# Patient Record
Sex: FEMALE | Race: OTHER | NOT HISPANIC OR LATINO | Employment: UNEMPLOYED | URBAN - METROPOLITAN AREA
[De-identification: names, ages, dates, MRNs, and addresses within clinical notes are randomized per-mention and may not be internally consistent; named-entity substitution may affect disease eponyms.]

---

## 2018-01-30 ENCOUNTER — OFFICE VISIT (OUTPATIENT)
Dept: MEDICAL GROUP | Facility: MEDICAL CENTER | Age: 33
End: 2018-01-30
Payer: COMMERCIAL

## 2018-01-30 VITALS
HEART RATE: 83 BPM | SYSTOLIC BLOOD PRESSURE: 118 MMHG | WEIGHT: 249 LBS | TEMPERATURE: 98.7 F | OXYGEN SATURATION: 97 % | DIASTOLIC BLOOD PRESSURE: 74 MMHG | BODY MASS INDEX: 39.08 KG/M2 | HEIGHT: 67 IN | RESPIRATION RATE: 15 BRPM

## 2018-01-30 DIAGNOSIS — M54.17 LUMBOSACRAL RADICULITIS: ICD-10-CM

## 2018-01-30 DIAGNOSIS — Z72.0 NICOTINE VAPOR PRODUCT USER: ICD-10-CM

## 2018-01-30 DIAGNOSIS — E66.9 OBESITY (BMI 30-39.9): ICD-10-CM

## 2018-01-30 DIAGNOSIS — N89.8 VAGINAL ITCHING: ICD-10-CM

## 2018-01-30 DIAGNOSIS — F31.81 BIPOLAR 2 DISORDER, MAJOR DEPRESSIVE EPISODE (HCC): ICD-10-CM

## 2018-01-30 DIAGNOSIS — Z12.4 PAP SMEAR FOR CERVICAL CANCER SCREENING: ICD-10-CM

## 2018-01-30 PROBLEM — F17.200 NICOTINE DEPENDENCE: Status: ACTIVE | Noted: 2018-01-30

## 2018-01-30 PROCEDURE — 99204 OFFICE O/P NEW MOD 45 MIN: CPT | Performed by: NURSE PRACTITIONER

## 2018-01-30 RX ORDER — CELECOXIB 200 MG/1
200 CAPSULE ORAL 2 TIMES DAILY
Qty: 60 CAP | Refills: 6 | Status: SHIPPED | OUTPATIENT
Start: 2018-01-30 | End: 2018-01-31 | Stop reason: SDUPTHER

## 2018-01-30 RX ORDER — LAMOTRIGINE 200 MG/1
200 TABLET ORAL DAILY
Qty: 90 TAB | Refills: 3 | Status: SHIPPED | OUTPATIENT
Start: 2018-01-30 | End: 2019-03-12 | Stop reason: SDUPTHER

## 2018-01-30 RX ORDER — CELECOXIB 200 MG/1
200 CAPSULE ORAL 2 TIMES DAILY
Qty: 60 CAP | Refills: 6 | COMMUNITY
Start: 2018-01-30 | End: 2018-01-30 | Stop reason: SDUPTHER

## 2018-01-30 RX ORDER — LAMOTRIGINE 200 MG/1
200 TABLET ORAL DAILY
COMMUNITY
Start: 2018-01-30 | End: 2018-01-30 | Stop reason: SDUPTHER

## 2018-01-30 RX ORDER — FLUOXETINE HYDROCHLORIDE 20 MG/1
20 CAPSULE ORAL DAILY
Qty: 90 CAP | Refills: 3 | Status: SHIPPED | OUTPATIENT
Start: 2018-01-30 | End: 2018-10-10 | Stop reason: SDUPTHER

## 2018-01-30 RX ORDER — FLUOXETINE HYDROCHLORIDE 20 MG/1
20 CAPSULE ORAL DAILY
COMMUNITY
End: 2018-01-30 | Stop reason: SDUPTHER

## 2018-01-30 RX ORDER — FLUCONAZOLE 150 MG/1
150 TABLET ORAL DAILY
Qty: 1 TAB | Refills: 1 | Status: SHIPPED | OUTPATIENT
Start: 2018-01-30 | End: 2019-03-12

## 2018-01-30 ASSESSMENT — PATIENT HEALTH QUESTIONNAIRE - PHQ9
5. POOR APPETITE OR OVEREATING: 1 - SEVERAL DAYS
CLINICAL INTERPRETATION OF PHQ2 SCORE: 2
SUM OF ALL RESPONSES TO PHQ QUESTIONS 1-9: 12

## 2018-01-30 NOTE — LETTER
[facility]  JENNY Walton.P.R.N.  [address] [facility] [address]  [facility] [address]  Fax: [phone]   Authorization for Release/Disclosure of   Protected Health Information   Name: SHO CRAWLEY : 1985 SSN: [id number]   Address: [address] [place] [address] Phone:    [phone] (home)    I authorize the entity listed below to release/disclose the PHI below to:   [facility]/Katie Hopper A.P.R.N. and JENNY Walton.P.RGUANACO.   Provider or Entity Name:  Dr. Marleny Bran   Address   City, [facility] Phone:      Fax:     Reason for request: continuity of care   Information to be released:    [  ] LAST COLONOSCOPY,  including any PATH REPORT and follow-up  [  ] LAST FIT/COLOGUARD RESULT [  ] LAST DEXA  [  ] LAST MAMMOGRAM  [  ] LAST PAP  [  ] LAST LABS [  ] RETINA EXAM REPORT  [  ] IMMUNIZATION RECORDS  [  ] Release all info      [  ] Check here and initial the line next to each item to release ALL health information INCLUDING  _____ Care and treatment for drug and / or alcohol abuse  _____ HIV testing, infection status, or AIDS  _____ Genetic Testing    DATES OF SERVICE OR TIME PERIOD TO BE DISCLOSED: _____________  I understand and acknowledge that:  * This Authorization may be revoked at any time by you in writing, except if your health information has already been used or disclosed.  * Your health information that will be used or disclosed as a result of you signing this authorization could be re-disclosed by the recipient. If this occurs, your re-disclosed health information may no longer be protected by State or Federal laws.  * You may refuse to sign this Authorization. Your refusal will not affect your ability to obtain treatment.  * This Authorization becomes effective upon signing and will  on (date) __________.      If no date is indicated, this Authorization will  one (1) year from the signature date.    Name: Sho  Jay    Signature:   Date:     1/30/2018       PLEASE FAX REQUESTED RECORDS BACK TO: [phone]

## 2018-01-31 DIAGNOSIS — M54.17 LUMBOSACRAL RADICULITIS: ICD-10-CM

## 2018-01-31 RX ORDER — CELECOXIB 200 MG/1
200 CAPSULE ORAL 2 TIMES DAILY
Qty: 60 CAP | Refills: 6 | Status: SHIPPED | OUTPATIENT
Start: 2018-01-31 | End: 2019-02-05

## 2018-01-31 NOTE — PATIENT INSTRUCTIONS
Fluconazole tablets  What is this medicine?  FLUCONAZOLE (floo ESTEPHANIE na zole) is an antifungal medicine. It is used to treat certain kinds of fungal or yeast infections.  This medicine may be used for other purposes; ask your health care provider or pharmacist if you have questions.  COMMON BRAND NAME(S): Diflucan  What should I tell my health care provider before I take this medicine?  They need to know if you have any of these conditions:  -electrolyte abnormalities  -history of irregular heart beat  -kidney disease  -an unusual or allergic reaction to fluconazole, other azole antifungals, medicines, foods, dyes, or preservatives  -pregnant or trying to get pregnant  -breast-feeding  How should I use this medicine?  Take this medicine by mouth. Follow the directions on the prescription label. Do not take your medicine more often than directed.  Talk to your pediatrician regarding the use of this medicine in children. Special care may be needed. This medicine has been used in children as young as 6 months of age.  Overdosage: If you think you have taken too much of this medicine contact a poison control center or emergency room at once.  NOTE: This medicine is only for you. Do not share this medicine with others.  What if I miss a dose?  If you miss a dose, take it as soon as you can. If it is almost time for your next dose, take only that dose. Do not take double or extra doses.  What may interact with this medicine?  Do not take this medicine with any of the following medications:  -cisapride  -pimozide  -red yeast rice  This medicine may also interact with the following medications:  -birth control pills  -cyclosporine  -diuretics like hydrochlorothiazide  -medicines for diabetes that are taken by mouth  -medicines for high cholesterol like atorvastatin, lovastatin or simvastatin  -phenytoin  -ramelteon  -rifabutin  -rifampin  -some medicines for anxiety or  sleep  -tacrolimus  -terfenadine  -theophylline  -tofacitinib  -warfarin  This list may not describe all possible interactions. Give your health care provider a list of all the medicines, herbs, non-prescription drugs, or dietary supplements you use. Also tell them if you smoke, drink alcohol, or use illegal drugs. Some items may interact with your medicine.  What should I watch for while using this medicine?  Visit your doctor or health care professional for regular checkups. If you are taking this medicine for a long time you may need blood work. Tell your doctor if your symptoms do not improve. Some fungal infections need many weeks or months of treatment to cure.  Alcohol can increase possible damage to your liver. Avoid alcoholic drinks.  If you have a vaginal infection, do not have sex until you have finished your treatment. You can wear a sanitary napkin. Do not use tampons. Wear freshly washed cotton, not synthetic, panties.  What side effects may I notice from receiving this medicine?  Side effects that you should report to your doctor or health care professional as soon as possible:  -allergic reactions like skin rash or itching, hives, swelling of the lips, mouth, tongue, or throat  -dark urine  -feeling dizzy or faint  -irregular heartbeat or chest pain  -redness, blistering, peeling or loosening of the skin, including inside the mouth  -trouble breathing  -unusual bruising or bleeding  -vomiting  -yellowing of the eyes or skin  Side effects that usually do not require medical attention (report to your doctor or health care professional if they continue or are bothersome):  -changes in how food tastes  -diarrhea  -headache  -stomach upset or nausea  This list may not describe all possible side effects. Call your doctor for medical advice about side effects. You may report side effects to FDA at 1-727-FDA-4992.  Where should I keep my medicine?  Keep out of the reach of children.  Store at room  temperature below 30 degrees C (86 degrees F). Throw away any medicine after the expiration date.  NOTE: This sheet is a summary. It may not cover all possible information. If you have questions about this medicine, talk to your doctor, pharmacist, or health care provider.  © 2014, Elsevier/Gold Standard. (9/17/2013 3:15:11 PM)  Varenicline oral tablets  What is this medicine?  VARENICLINE (stephanie EN i kleen) is used to help people quit smoking. It can reduce the symptoms caused by stopping smoking. It is used with a patient support program recommended by your physician.  This medicine may be used for other purposes; ask your health care provider or pharmacist if you have questions.  COMMON BRAND NAME(S): Chantix  What should I tell my health care provider before I take this medicine?  They need to know if you have any of these conditions:  -bipolar disorder, depression, schizophrenia or other mental illness  -heart disease  -kidney disease  -peripheral vascular disease  -stroke  -suicidal thoughts, plans, or attempt; a previous suicide attempt by you or a family member  -an unusual or allergic reaction to varenicline, other medicines, foods, dyes, or preservatives  -pregnant or trying to get pregnant  -breast-feeding  How should I use this medicine?  You should set a date to stop smoking and tell your doctor. Start this medicine one week before the quit date. You can also start taking this medicine before you choose a quit date, and then pick a quit date that is between 8 and 35 days of treatment with this medicine. Stick to your plan; ask about support groups or other ways to help you remain a 'quitter'.  Take this medicine by mouth after eating. Take with a full glass of water. Follow the directions on the prescription label. Take your doses at regular intervals. Do not take your medicine more often than directed.  A special MedGuide will be given to you by the pharmacist with each prescription and refill. Be sure  to read this information carefully each time.  Talk to your pediatrician regarding the use of this medicine in children. This medicine is not approved for use in children.  Overdosage: If you think you have taken too much of this medicine contact a poison control center or emergency room at once.  NOTE: This medicine is only for you. Do not share this medicine with others.  What if I miss a dose?  If you miss a dose, take it as soon as you can. If it is almost time for your next dose, take only that dose. Do not take double or extra doses.  What may interact with this medicine?  -insulin  -other stop smoking aids  -theophylline  -warfarin  This list may not describe all possible interactions. Give your health care provider a list of all the medicines, herbs, non-prescription drugs, or dietary supplements you use. Also tell them if you smoke, drink alcohol, or use illegal drugs. Some items may interact with your medicine.  What should I watch for while using this medicine?  Visit your doctor or health care professional for regular check ups. Ask for ongoing advice and encouragement from your doctor or healthcare professional, friends, and family to help you quit. If you smoke while on this medication, quit again  Your mouth may get dry. Chewing sugarless gum or sucking hard candy, and drinking plenty of water may help. Contact your doctor if the problem does not go away or is severe.  You may get drowsy or dizzy. Do not drive, use machinery, or do anything that needs mental alertness until you know how this medicine affects you. Do not stand or sit up quickly, especially if you are an older patient. This reduces the risk of dizzy or fainting spells.  The use of this medicine may increase the chance of suicidal thoughts or actions. Pay special attention to how you are responding while on this medicine. Any worsening of mood, or thoughts of suicide or dying should be reported to your health care professional right  away.  What side effects may I notice from receiving this medicine?  Side effects that you should report to your doctor or health care professional as soon as possible:  -allergic reactions like skin rash, itching or hives, swelling of the face, lips, tongue, or throat  -breathing problems  -changes in vision  -chest pain or chest tightness  -confusion, trouble speaking or understanding  -fast, irregular heartbeat  -feeling faint or lightheaded, falls  -fever  -pain in legs when walking  -problems with balance, talking, walking  -ringing in ears  -sudden numbness or weakness of the face, arm or leg  -suicidal thoughts or other mood changes  -trouble passing urine or change in the amount of urine  -unusual bleeding or bruising  -unusually weak or tired  Side effects that usually do not require medical attention (report to your doctor or health care professional if they continue or are bothersome):  -constipation  -headache  -nausea, vomiting  -strange dreams  -stomach gas  -trouble sleeping  This list may not describe all possible side effects. Call your doctor for medical advice about side effects. You may report side effects to FDA at 4-334-FDA-4784.  Where should I keep my medicine?  Keep out of the reach of children.  Store at room temperature between 15 and 30 degrees C (59 and 86 degrees F). Throw away any unused medicine after the expiration date.  NOTE: This sheet is a summary. It may not cover all possible information. If you have questions about this medicine, talk to your doctor, pharmacist, or health care provider.  © 2014, Elsevier/Gold Standard. (7/25/2011 3:12:38 PM)

## 2018-01-31 NOTE — PROGRESS NOTES
CC: vaginal itching      Jg Thompson is a 32 y.o. female here to establish care and to discuss the evaluation and management of:    1. Vaginal itching  Patient states she has had vaginal itching for several weeks now. States that she has noticed some thick white mucousy discharge. States that she does have a IUD so her periods are not regular. States she has tried nothing to help her symptoms. Denies any fevers, chills, nausea or vomiting.    2. Bipolar 2 disorder, major depressive episode (CMS-[facility])  Patient states that she has bipolar 2 and has been treated with Prozac for the last 5 years and lamotrigine for the last year and a half almost 2 years. Patient states she tolerated these medications well without any side effects. Denies any suicidal ideations. States she is in the process of finding a new psychiatrist as her insurance change and she can no longer go to her previous one.    3. Lumbosacral radiculitis  Patient states that she was told she has this from her podiatry doctor who just recently today prescribed her a steroid taper pack to see if this can help with her symptoms. Patient states that she recently was told that she had had plantar fasciitis however symptoms were not improving with orthotics and anti-inflammatories. Patient states she currently takes Celebrex for her foot pain. States that she was  for a long time and this has caused her a great deal of pain in her feet.    4. Nicotine vapor product user  Patient states that she used to be a tobacco cigarette smoker however had changed over to the being and then was on Chantix for a few months and was able to quit bathing and cigarette smoking. States however had stopped using Chantix a few months ago and he started bleeding again. Requesting a refill on Chantix.        ROS:  Denies any Headache, Blurred Vision, Confusion Chest pain,  Shortness of breath,  Abdominal pain, Changes of bowel or bladder, Lower ext edema, Fevers, Nights  "sweats, Weight Changes, Focal weakness or numbness.  All other systems are negative.+ Bilateral foot pain, vaginal itch      Current Outpatient Prescriptions:   •  fluoxetine (PROZAC) 20 MG Cap, Take 1 Cap by mouth every day., Disp: 90 Cap, Rfl: 3  •  varenicline (CHANTIX STARTING MONTH JOSE JUAN) 0.5 MG X 11 & 1 MG X 42 tablet, On day 1-3 take 0.5mg daily, then on day 4-7 take 0.5mg twice daily, then take 1mg twice daily for 11 weeks., Disp: 56 Tab, Rfl: 3  •  fluconazole (DIFLUCAN) 150 MG tablet, Take 1 Tab by mouth every day., Disp: 1 Tab, Rfl: 1  •  celecoxib (CELEBREX) 200 MG Cap, Take 1 Cap by mouth 2 times a day., Disp: 60 Cap, Rfl: 6  •  lamotrigine (LAMICTAL) 200 MG tablet, Take 1 Tab by mouth every day., Disp: 90 Tab, Rfl: 3    No Known Allergies    Past Medical History:   Diagnosis Date   • Anxiety    • Depression    • Herpes     2006   • Lumbosacral radiculitis 1/30/2018   • Substance abuse      Past Surgical History:   Procedure Laterality Date   • TONSILLECTOMY       Family History   Problem Relation Age of Onset   • Psychiatry Mother    • Thyroid Mother    • Hypertension Mother    • Diabetes Mother    • Hypertension Maternal Grandfather      Social History     Social History   • Marital status: Single     Spouse name: N/A   • Number of children: N/A   • Years of education: N/A     Occupational History   • Not on file.     Social History Main Topics   • Smoking status: Former Smoker     Types: Cigarettes   • Smokeless tobacco: Never Used   • Alcohol use No   • Drug use: No      Comment: lasted used 2 yrs ago, IV user. every day.    • Sexual activity: Yes     Partners: Male     Other Topics Concern   • Not on file     Social History Narrative   • No narrative on file       Objective:     Vitals: /74   Pulse 83   Temp 37.1 °C (98.7 °F)   Resp 15   Ht 1.702 m (5' 7\")   Wt 112.9 kg (249 lb)   LMP 04/09/2011   SpO2 97%   BMI 39.00 kg/m²      General: Alert, pleasant, NAD  HEENT:  Normocephalic.  " Neck supple.  No thyromegaly or masses palpated. No cervical or supraclavicular lymphadenopathy.  Heart:  Regular rate and rhythm.  S1 and S2 normal.  No murmurs appreciated.  Respiratory:  Normal respiratory effort.  Clear to auscultation bilaterally.    Skin:  Warm, dry, no rashes  GYN: +report of vaginal itching and mucous discharge  Musculoskeletal:  Gait is normal.  Moves all extremities well.+pain in bilateral feet, no erythema or crepitus noted.  Extremities:   No leg edema.  Neurological: No tremors, sensation grossly intact  Psych:  Affect/mood is normal, judgement is good, memory is intact, grooming is appropriate.      Assessment and Plan.   32 y.o. female to establish and discuss the following      1. Vaginal itching  Patient declined doing vaginal pathogens test. Discussed perineal care, avoid douching, change underwear daily, wear cotton underwear, shower after working out, try to avoid wearing spandex/stretchy pants for extended periods of time.   - fluconazole (DIFLUCAN) 150 MG tablet; Take 1 Tab by mouth every day.  Dispense: 1 Tab; Refill: 1    2. Bipolar 2 disorder, major depressive episode (CMS-HCC)  Chronic. Stable, no suicidal ideations or fluctuant mood swings. Continue taking Prozac and lamotrigine daily. Avoid excessive alcohol intake, try to include exercise every day, and eat a heart healthy diet. Patient is to find out if she needs a referral for a new psychiatrist with her new insurance. If she does she will contact our office and I will place one.  - Patient has been identified as being depressed and appropriate orders and counseling have been given  - fluoxetine (PROZAC) 20 MG Cap; Take 1 Cap by mouth every day.  Dispense: 90 Cap; Refill: 3  - lamotrigine (LAMICTAL) 200 MG tablet; Take 1 Tab by mouth every day.  Dispense: 90 Tab; Refill: 3    3. Lumbosacral radiculitis  Chronic. Followed by her podiatrist.  - celecoxib (CELEBREX) 200 MG Cap; Take 1 Cap by mouth 2 times a day.   Dispense: 60 Cap; Refill: 6    4. Nicotine vapor product user  Chronic. Currently active with the vaping 3mg of nicotine. Will start Chantix again. Discussed with patient the importance of reducing tobacco consumption. Educated patient regarding the effects of tobacco use on cardiovascular system.  - varenicline (CHANTIX STARTING MONTH PAK) 0.5 MG X 11 & 1 MG X 42 tablet; On day 1-3 take 0.5mg daily, then on day 4-7 take 0.5mg twice daily, then take 1mg twice daily for 11 weeks.  Dispense: 56 Tab; Refill: 3    5. Obesity (BMI 30-39.9)  Chronic. Patient encouraged to reduce excess calorie consumption. Encouraged regular exercise. Discussed long term sequelae of obesity.   - Patient identified as having weight management issue.  Appropriate orders and counseling given.    6. Pap smear for cervical cancer screening    - REFERRAL TO GYNECOLOGY      Health Maintenance: Requesting records from previous provider as there are recent labs completed. Will review and order f/u as needed.     Return in about 6 months (around 7/30/2018).          Katie JOHN

## 2018-03-20 ENCOUNTER — HOSPITAL ENCOUNTER (OUTPATIENT)
Dept: LAB | Facility: MEDICAL CENTER | Age: 33
End: 2018-03-20
Attending: PHYSICIAN ASSISTANT
Payer: COMMERCIAL

## 2018-03-20 PROCEDURE — 87624 HPV HI-RISK TYP POOLED RSLT: CPT

## 2018-03-20 PROCEDURE — 88175 CYTOPATH C/V AUTO FLUID REDO: CPT

## 2018-03-21 LAB
CYTOLOGY REG CYTOL: NORMAL
HPV HR 12 DNA CVX QL NAA+PROBE: NEGATIVE
HPV16 DNA SPEC QL NAA+PROBE: NEGATIVE
HPV18 DNA SPEC QL NAA+PROBE: NEGATIVE
SPECIMEN SOURCE: NORMAL

## 2018-04-16 ENCOUNTER — OFFICE VISIT (OUTPATIENT)
Dept: URGENT CARE | Facility: PHYSICIAN GROUP | Age: 33
End: 2018-04-16
Payer: COMMERCIAL

## 2018-04-16 VITALS
SYSTOLIC BLOOD PRESSURE: 130 MMHG | WEIGHT: 250 LBS | HEIGHT: 67 IN | HEART RATE: 75 BPM | TEMPERATURE: 97.2 F | DIASTOLIC BLOOD PRESSURE: 68 MMHG | BODY MASS INDEX: 39.24 KG/M2 | OXYGEN SATURATION: 98 %

## 2018-04-16 DIAGNOSIS — S61.411A LACERATION OF RIGHT HAND WITHOUT FOREIGN BODY, INITIAL ENCOUNTER: ICD-10-CM

## 2018-04-16 PROCEDURE — 12001 RPR S/N/AX/GEN/TRNK 2.5CM/<: CPT | Performed by: PHYSICIAN ASSISTANT

## 2018-04-16 ASSESSMENT — PAIN SCALES - GENERAL: PAINLEVEL: 5=MODERATE PAIN

## 2018-04-17 ASSESSMENT — ENCOUNTER SYMPTOMS
CHILLS: 0
DIARRHEA: 0
NAUSEA: 0
SHORTNESS OF BREATH: 0
DIZZINESS: 0
MUSCULOSKELETAL NEGATIVE: 1
FEVER: 0
ABDOMINAL PAIN: 0
ROS SKIN COMMENTS: POSITIVE FOR LACERATION
VOMITING: 0

## 2018-04-17 NOTE — PROGRESS NOTES
"Subjective:      Jg Thompson is a 33 y.o. female who presents with Laceration (R hand while washing dishes)            Laceration    The incident occurred less than 1 hour ago. The laceration is located on the right hand. The laceration is 1 cm in size. The laceration mechanism was a broken glass. The pain is at a severity of 1/10. The pain is mild. The pain has been constant since onset. She reports no foreign bodies present. Her tetanus status is UTD.       Review of Systems   Constitutional: Negative for chills and fever.   HENT: Negative for congestion.    Respiratory: Negative for shortness of breath.    Cardiovascular: Negative for chest pain.   Gastrointestinal: Negative for abdominal pain, diarrhea, nausea and vomiting.   Genitourinary: Negative.    Musculoskeletal: Negative.    Skin:        Positive for laceration   Neurological: Negative for dizziness.          Objective:     /68   Pulse 75   Temp 36.2 °C (97.2 °F)   Ht 1.702 m (5' 7\")   Wt 113.4 kg (250 lb)   LMP 04/09/2011   SpO2 98%   BMI 39.16 kg/m²      Physical Exam   Constitutional: She is oriented to person, place, and time. She appears well-developed and well-nourished. No distress.   HENT:   Head: Normocephalic and atraumatic.   Eyes: Pupils are equal, round, and reactive to light.   Neck: Normal range of motion.   Cardiovascular: Normal rate.    Pulmonary/Chest: Effort normal.   Musculoskeletal: Normal range of motion.        Hands:  Linear laceration present in webspace between right thumb and 2nd digit, measuring approximately 1 cm. No active bleeding or foreign bodies noted.    Neurological: She is alert and oriented to person, place, and time.   Skin: Skin is warm and dry. She is not diaphoretic.   Psychiatric: She has a normal mood and affect. Her behavior is normal.   Nursing note and vitals reviewed.         PMH:  has a past medical history of Anxiety; Depression; Herpes; Lumbosacral radiculitis (1/30/2018); and " Substance abuse. She also has no past medical history of Addisons disease (CMS-HCC); Adrenal disorder (CMS-HCC); Allergy; Anemia; Blood transfusion; CHF (congestive heart failure) (CMS-HCC); Clotting disorder (CMS-HCC); Cushings syndrome (CMS-HCC); Diabetic neuropathy (CMS-HCC); EMPHYSEMA; GERD (gastroesophageal reflux disease); Goiter; Headache(784.0); Heart attack; HIV (human immunodeficiency virus infection); Hyperlipidemia; IBD (inflammatory bowel disease); Kidney disease; Meningitis; Migraine; OSTEOPOROSIS; Parathyroid disorder (CMS-HCC); Pituitary disease (CMS-HCC); Sickle cell disease (CMS-HCC); Thyroid disease; Tuberculosis; Ulcer (CMS-HCC); or Urinary tract infection, site not specified.  MEDS:   Current Outpatient Prescriptions:   •  fluoxetine (PROZAC) 20 MG Cap, Take 1 Cap by mouth every day., Disp: 90 Cap, Rfl: 3  •  lamotrigine (LAMICTAL) 200 MG tablet, Take 1 Tab by mouth every day., Disp: 90 Tab, Rfl: 3  •  celecoxib (CELEBREX) 200 MG Cap, Take 1 Cap by mouth 2 times a day., Disp: 60 Cap, Rfl: 6  •  varenicline (CHANTIX STARTING MONTH JOSE JUAN) 0.5 MG X 11 & 1 MG X 42 tablet, On day 1-3 take 0.5mg daily, then on day 4-7 take 0.5mg twice daily, then take 1mg twice daily for 11 weeks., Disp: 56 Tab, Rfl: 3  •  fluconazole (DIFLUCAN) 150 MG tablet, Take 1 Tab by mouth every day., Disp: 1 Tab, Rfl: 1  ALLERGIES:   Allergies   Allergen Reactions   • Penicillins      SURGHX:   Past Surgical History:   Procedure Laterality Date   • TONSILLECTOMY       SOCHX:  reports that she has quit smoking. Her smoking use included Cigarettes. She has never used smokeless tobacco. She reports that she does not drink alcohol or use drugs.  FH: family history includes Diabetes in her mother; Hypertension in her maternal grandfather and mother; Psychiatry in her mother; Thyroid in her mother.       Assessment/Plan:     1. Laceration of right hand without foreign body, initial encounter    Procedure: Laceration Repair  -Risks  including bleeding, nerve damage, infection, and poor cosmetic outcome discussed at length. Benefits and alternatives discussed.   -Sterile technique throughout  -Local anesthesia with 2% lidocaine without epi  -Closed with #3  4-0 Nylon interrupted sutures with good wound approximation  -Polysporin and dressing placed  -Patient tolerated well     Wound care discussed at length. RTC in 7-10 days for suture removal, or sooner if signs of infection develop.

## 2018-04-25 ENCOUNTER — OFFICE VISIT (OUTPATIENT)
Dept: URGENT CARE | Facility: PHYSICIAN GROUP | Age: 33
End: 2018-04-25
Payer: COMMERCIAL

## 2018-04-25 VITALS
DIASTOLIC BLOOD PRESSURE: 72 MMHG | WEIGHT: 250 LBS | RESPIRATION RATE: 18 BRPM | TEMPERATURE: 97.7 F | SYSTOLIC BLOOD PRESSURE: 120 MMHG | HEART RATE: 78 BPM | BODY MASS INDEX: 37.89 KG/M2 | HEIGHT: 68 IN | OXYGEN SATURATION: 98 %

## 2018-04-25 DIAGNOSIS — Z48.02 VISIT FOR SUTURE REMOVAL: ICD-10-CM

## 2018-04-25 PROCEDURE — 99212 OFFICE O/P EST SF 10 MIN: CPT | Performed by: NURSE PRACTITIONER

## 2018-04-25 ASSESSMENT — ENCOUNTER SYMPTOMS
CONSTITUTIONAL NEGATIVE: 1
RESPIRATORY NEGATIVE: 1
CARDIOVASCULAR NEGATIVE: 1
NEUROLOGICAL NEGATIVE: 1

## 2018-04-25 ASSESSMENT — PAIN SCALES - GENERAL: PAINLEVEL: 5=MODERATE PAIN

## 2018-04-26 NOTE — PROGRESS NOTES
Subjective:      Jg Thompson is a 33 y.o. female who presents with Hand Pain (previous stitches fell out; painful, swollen x3days)            HPI  Has stiches placed 4/16 to rt hand b/w 1st and 2nd digit.  Pt went out of town and lost to stiches--> fellout  Pt c/o pain and tenderness to touch  C/o Swelling and redness  No drainage  No fevers  No swimming just showers  10/10 when touches it   Worried about infection    PMH:  has a past medical history of Anxiety; Depression; Herpes; Lumbosacral radiculitis (1/30/2018); and Substance abuse. She also has no past medical history of Addisons disease (CMS-HCC); Adrenal disorder (CMS-HCC); Allergy; Anemia; Blood transfusion; CHF (congestive heart failure) (CMS-HCC); Clotting disorder (CMS-HCC); Cushings syndrome (CMS-HCC); Diabetic neuropathy (CMS-HCC); EMPHYSEMA; GERD (gastroesophageal reflux disease); Goiter; Headache(784.0); Heart attack; HIV (human immunodeficiency virus infection); Hyperlipidemia; IBD (inflammatory bowel disease); Kidney disease; Meningitis; Migraine; OSTEOPOROSIS; Parathyroid disorder (CMS-HCC); Pituitary disease (CMS-HCC); Sickle cell disease (CMS-HCC); Thyroid disease; Tuberculosis; Ulcer (CMS-HCC); or Urinary tract infection, site not specified.  MEDS:   Current Outpatient Prescriptions:   •  fluoxetine (PROZAC) 20 MG Cap, Take 1 Cap by mouth every day., Disp: 90 Cap, Rfl: 3  •  lamotrigine (LAMICTAL) 200 MG tablet, Take 1 Tab by mouth every day., Disp: 90 Tab, Rfl: 3  •  celecoxib (CELEBREX) 200 MG Cap, Take 1 Cap by mouth 2 times a day., Disp: 60 Cap, Rfl: 6  •  varenicline (CHANTIX STARTING MONTH PAK) 0.5 MG X 11 & 1 MG X 42 tablet, On day 1-3 take 0.5mg daily, then on day 4-7 take 0.5mg twice daily, then take 1mg twice daily for 11 weeks., Disp: 56 Tab, Rfl: 3  •  fluconazole (DIFLUCAN) 150 MG tablet, Take 1 Tab by mouth every day., Disp: 1 Tab, Rfl: 1  ALLERGIES:   Allergies   Allergen Reactions   • Penicillins      SURGHX:   Past  "Surgical History:   Procedure Laterality Date   • TONSILLECTOMY       SOCHX:  reports that she has quit smoking. Her smoking use included Cigarettes. She has never used smokeless tobacco. She reports that she drinks alcohol. She reports that she does not use drugs.  FH: family history includes Diabetes in her mother; Hypertension in her maternal grandfather and mother; Psychiatry in her mother; Thyroid in her mother.      Review of Systems   Constitutional: Negative.    Respiratory: Negative.    Cardiovascular: Negative.    Musculoskeletal: Positive for joint pain.   Skin:        Healed laceration   Neurological: Negative.           Objective:     /72   Pulse 78   Temp 36.5 °C (97.7 °F)   Resp 18   Ht 1.715 m (5' 7.5\")   Wt 113.4 kg (250 lb)   LMP 04/09/2011   SpO2 98%   BMI 38.58 kg/m²      Physical Exam   Constitutional: She is oriented to person, place, and time. She appears well-developed and well-nourished.   HENT:   Head: Normocephalic and atraumatic.   Eyes: Conjunctivae are normal.   Neck: Normal range of motion. Neck supple.   Cardiovascular: Normal rate, regular rhythm and normal heart sounds.    Pulmonary/Chest: Effort normal and breath sounds normal.   Musculoskeletal: Normal range of motion.   Neurological: She is alert and oriented to person, place, and time.   Skin: Skin is warm. Capillary refill takes less than 2 seconds.        1 stitch to rt hand b/w thumb and 2nd digit  No signs of infection  No erythema  No drainage  2+ radial pulse  No swelling to hands     Psychiatric: She has a normal mood and affect. Her behavior is normal. Judgment and thought content normal.               Assessment/Plan:     1. Visit for suture removal         1 suture removed for rt hand.  No signs of infection  Dicussed signs and symptoms of infection to monitor for  Apply polysporin and cover with bandage  No soaking until completely healed  f/u for any worsening symptoms      "

## 2018-07-30 ENCOUNTER — OFFICE VISIT (OUTPATIENT)
Dept: MEDICAL GROUP | Facility: MEDICAL CENTER | Age: 33
End: 2018-07-30
Payer: COMMERCIAL

## 2018-07-30 VITALS
HEIGHT: 68 IN | DIASTOLIC BLOOD PRESSURE: 62 MMHG | HEART RATE: 97 BPM | WEIGHT: 252 LBS | TEMPERATURE: 98.5 F | OXYGEN SATURATION: 95 % | BODY MASS INDEX: 38.19 KG/M2 | SYSTOLIC BLOOD PRESSURE: 108 MMHG

## 2018-07-30 DIAGNOSIS — Z13.220 SCREENING FOR HYPERLIPIDEMIA: ICD-10-CM

## 2018-07-30 DIAGNOSIS — F17.290 OTHER TOBACCO PRODUCT NICOTINE DEPENDENCE, UNCOMPLICATED: ICD-10-CM

## 2018-07-30 DIAGNOSIS — R53.83 OTHER FATIGUE: ICD-10-CM

## 2018-07-30 DIAGNOSIS — E66.9 OBESITY (BMI 35.0-39.9 WITHOUT COMORBIDITY): ICD-10-CM

## 2018-07-30 DIAGNOSIS — E66.9 OBESITY (BMI 30-39.9): ICD-10-CM

## 2018-07-30 DIAGNOSIS — F31.81 BIPOLAR 2 DISORDER, MAJOR DEPRESSIVE EPISODE (HCC): ICD-10-CM

## 2018-07-30 PROCEDURE — 99214 OFFICE O/P EST MOD 30 MIN: CPT | Performed by: INTERNAL MEDICINE

## 2018-07-30 RX ORDER — OMEPRAZOLE 20 MG/1
20 CAPSULE, DELAYED RELEASE ORAL DAILY
COMMUNITY
End: 2020-07-28

## 2018-07-31 NOTE — ASSESSMENT & PLAN NOTE
She is bipolar.  She is on Prozac 20 mg daily and she thinks she needs to increase the dose.  She also is on Lamictal.  She has had no trouble taking Prozac.

## 2018-07-31 NOTE — ASSESSMENT & PLAN NOTE
She is trying to get off of vaping.  She is using Chantix which seems to help quite a bit.  She has had no trouble starting this medication.  The prescription was written in January but she just recently started it.

## 2018-07-31 NOTE — PROGRESS NOTES
Subjective:     Chief Complaint   Patient presents with   • Follow-Up     discuss vesna Gregorio Jay is a 33 y.o. female here today for further evaluation of depression as well as evaluation of fatigue.  She is accompanied by her .    Screening for hyperlipidemia  She has not had a lipid panel checked in quite some time and would like to get that done.    Other fatigue  She feels more fatigued than usual.  She is under more stress than usual.  She wonders about anemia or thyroid problems.  Thyroid trouble does run in the family apparently.  She denies any significant temperature intolerance or recent change in bowel habits.    Obesity (BMI 35.0-39.9 without comorbidity) ([facility])  She remains quite overweight with BMI 38.64.    Nicotine dependence  She is trying to get off of vaping.  She is using Chantix which seems to help quite a bit.  She has had no trouble starting this medication.  The prescription was written in January but she just recently started it.    Bipolar 2 disorder, major depressive episode (CMS-HCC) ([facility])  She is bipolar.  She is on Prozac 20 mg daily and she thinks she needs to increase the dose.  She also is on Lamictal.  She has had no trouble taking Prozac.       Diagnoses of Other fatigue, Screening for hyperlipidemia, Obesity (BMI 30-39.9), Bipolar 2 disorder, major depressive episode (HCC), Other tobacco product nicotine dependence, uncomplicated, and Obesity (BMI 35.0-39.9 without comorbidity) were pertinent to this visit.    Allergies: Penicillins  Current medicines (including changes today)  Current Outpatient Prescriptions   Medication Sig Dispense Refill   • omeprazole (PRILOSEC) 20 MG delayed-release capsule Take 20 mg by mouth every day.     • fluoxetine (PROZAC) 20 MG Cap Take 1 Cap by mouth every day. 90 Cap 3   • varenicline (CHANTIX STARTING MONTH PAK) 0.5 MG X 11 & 1 MG X 42 tablet On day 1-3 take 0.5mg daily, then on day 4-7 take 0.5mg twice daily, then take 1mg twice  "daily for 11 weeks. 56 Tab 3   • fluconazole (DIFLUCAN) 150 MG tablet Take 1 Tab by mouth every day. 1 Tab 1   • lamotrigine (LAMICTAL) 200 MG tablet Take 1 Tab by mouth every day. 90 Tab 3   • celecoxib (CELEBREX) 200 MG Cap Take 1 Cap by mouth 2 times a day. 60 Cap 6     No current facility-administered medications for this visit.        She  has a past medical history of Anxiety; Depression; Herpes; Lumbosacral radiculitis (1/30/2018); Other fatigue (7/30/2018); and Substance abuse. She also has no past medical history of Addisons disease (HCC); Adrenal disorder (HCC); Allergy; Anemia; Blood transfusion; CHF (congestive heart failure) (HCC); Clotting disorder (HCC); Cushings syndrome (HCC); Diabetic neuropathy (HCC); EMPHYSEMA; GERD (gastroesophageal reflux disease); Goiter; Headache(784.0); Heart attack (HCC); HIV (human immunodeficiency virus infection); Hyperlipidemia; IBD (inflammatory bowel disease); Kidney disease; Meningitis; Migraine; OSTEOPOROSIS; Parathyroid disorder (HCC); Pituitary disease (HCC); Sickle cell disease (HCC); Thyroid disease; Tuberculosis; Ulcer; or Urinary tract infection, site not specified.    ROS    Patient denies significant change in strength, weight or appetite.  No significant lightheadedness or headaches.  No change in vision, hearing, or swallowing.  No new dyspnea, coughing, chest pain, or palpitations.  No indigestion, abdominal pain, or change in bowel habits.  No change in urinating.  No new ankle swelling.       Objective:     PE:  /62   Pulse 97   Temp 36.9 °C (98.5 °F)   Ht 1.72 m (5' 7.72\")   Wt 114.3 kg (252 lb)   SpO2 95%   BMI 38.64 kg/m²    Neck is supple without significant lymphadenopathy or masses.  Lungs are clear with normal breath sounds without wheezes or rales .  Cardiovascular: peripheral circulation is satisfactory, heart sounds are unchanged and unremarkable.  Abdomen is soft, without masses or tenderness, with normal bowel " sounds.  Extremities are without significant edema, cyanosis or deformity.      Assessment and Plan:   The following treatment plan was discussed  1. Other fatigue  CBC WITH DIFFERENTIAL    TSH    BASIC METABOLIC PANEL    We will check thyroid function as well as a blood count.   2. Screening for hyperlipidemia  LIPID PROFILE    We will get a screening lipid panel done.   3. Obesity (BMI 30-39.9)     4. Bipolar 2 disorder, major depressive episode (HCC)      Continue same dose Lamictal.  She will increase Prozac to 40 mg daily.  We reviewed potential side effects.  She is not interested in harming herself.   5. Other tobacco product nicotine dependence, uncomplicated      Continue Chantix and weaning off of nicotine.   6. Obesity (BMI 35.0-39.9 without comorbidity)  Patient identified as having weight management issue.  Appropriate orders and counseling given.    She was encouraged and weight loss program including appropriate diet and exercise.       Followup: Return in about 4 weeks (around 8/27/2018) for Short.

## 2018-07-31 NOTE — ASSESSMENT & PLAN NOTE
She feels more fatigued than usual.  She is under more stress than usual.  She wonders about anemia or thyroid problems.  Thyroid trouble does run in the family apparently.  She denies any significant temperature intolerance or recent change in bowel habits.

## 2018-07-31 NOTE — PROGRESS NOTES
Subjective:     Chief Complaint   Patient presents with   • Follow-Up     discuss vesna Gregorio Jay is a 33 y.o. female here today for consideration especially of increasing dose of Paxil.  She also feels fatigued and is overweight.    Screening for hyperlipidemia  She has not had a lipid panel checked in quite some time and would like to get that done.    Other fatigue  She feels more fatigued than usual.  She is under more stress than usual.  She wonders about anemia or thyroid problems.  Thyroid trouble does run in the family apparently.  She denies any significant temperature intolerance or recent change in bowel habits.    Obesity (BMI 35.0-39.9 without comorbidity) ([facility])  She remains quite overweight with BMI 38.64.    Nicotine dependence  She is trying to get off of vaping.  She is using Chantix which seems to help quite a bit.  She has had no trouble starting this medication.  The prescription was written in January but she just recently started it.    Bipolar 2 disorder, major depressive episode (CMS-HCC) ([facility])  She is bipolar.  She is on Prozac 20 mg daily and she thinks she needs to increase the dose.  She also is on Lamictal.  She has had no trouble taking Prozac.       Diagnoses of Other fatigue, Screening for hyperlipidemia, Obesity (BMI 30-39.9), Bipolar 2 disorder, major depressive episode (HCC), Other tobacco product nicotine dependence, uncomplicated, and Obesity (BMI 35.0-39.9 without comorbidity) were pertinent to this visit.    Allergies: Penicillins  Current medicines (including changes today)  Current Outpatient Prescriptions   Medication Sig Dispense Refill   • omeprazole (PRILOSEC) 20 MG delayed-release capsule Take 20 mg by mouth every day.     • fluoxetine (PROZAC) 20 MG Cap Take 1 Cap by mouth every day. 90 Cap 3   • varenicline (CHANTIX STARTING MONTH PAK) 0.5 MG X 11 & 1 MG X 42 tablet On day 1-3 take 0.5mg daily, then on day 4-7 take 0.5mg twice daily, then take 1mg twice daily  "for 11 weeks. 56 Tab 3   • fluconazole (DIFLUCAN) 150 MG tablet Take 1 Tab by mouth every day. 1 Tab 1   • lamotrigine (LAMICTAL) 200 MG tablet Take 1 Tab by mouth every day. 90 Tab 3   • celecoxib (CELEBREX) 200 MG Cap Take 1 Cap by mouth 2 times a day. 60 Cap 6     No current facility-administered medications for this visit.        She  has a past medical history of Anxiety; Depression; Herpes; Lumbosacral radiculitis (1/30/2018); Other fatigue (7/30/2018); and Substance abuse. She also has no past medical history of Addisons disease (HCC); Adrenal disorder (HCC); Allergy; Anemia; Blood transfusion; CHF (congestive heart failure) (HCC); Clotting disorder (HCC); Cushings syndrome (HCC); Diabetic neuropathy (HCC); EMPHYSEMA; GERD (gastroesophageal reflux disease); Goiter; Headache(784.0); Heart attack (HCC); HIV (human immunodeficiency virus infection); Hyperlipidemia; IBD (inflammatory bowel disease); Kidney disease; Meningitis; Migraine; OSTEOPOROSIS; Parathyroid disorder (HCC); Pituitary disease (HCC); Sickle cell disease (HCC); Thyroid disease; Tuberculosis; Ulcer; or Urinary tract infection, site not specified.    ROS    Patient denies significant change in strength, weight or appetite.  No significant lightheadedness or headaches.  No change in vision, hearing, or swallowing.  No new dyspnea, coughing, chest pain, or palpitations.  No indigestion, abdominal pain, or change in bowel habits.  No change in urinating.  No new ankle swelling.       Objective:     PE:  /62   Pulse 97   Temp 36.9 °C (98.5 °F)   Ht 1.72 m (5' 7.72\")   Wt 114.3 kg (252 lb)   SpO2 95%   BMI 38.64 kg/m²    Neck is supple without significant lymphadenopathy or masses.  Lungs are clear with normal breath sounds without wheezes or rales .  Cardiovascular: peripheral circulation is satisfactory, heart sounds are unchanged and unremarkable.  Abdomen is soft, without masses or tenderness, with normal bowel sounds.  Extremities are " without significant edema, cyanosis or deformity.      Assessment and Plan:   The following treatment plan was discussed  1. Other fatigue  CBC WITH DIFFERENTIAL    TSH    BASIC METABOLIC PANEL    We will check thyroid function as well as a blood count.   2. Screening for hyperlipidemia  LIPID PROFILE    We will get a screening lipid panel done.   3. Obesity (BMI 30-39.9)     4. Bipolar 2 disorder, major depressive episode (HCC)      Continue same dose Lamictal.  She will increase Paxil to 40 mg daily.  We reviewed potential side effects.   5. Other tobacco product nicotine dependence, uncomplicated      Continue Chantix and weaning off of nicotine.   6. Obesity (BMI 35.0-39.9 without comorbidity)      She was encouraged and weight loss program including appropriate diet and exercise.       Followup: Return in about 4 weeks (around 8/27/2018) for Short.

## 2018-09-04 ENCOUNTER — OFFICE VISIT (OUTPATIENT)
Dept: MEDICAL GROUP | Facility: MEDICAL CENTER | Age: 33
End: 2018-09-04
Payer: COMMERCIAL

## 2018-09-04 VITALS
WEIGHT: 244 LBS | RESPIRATION RATE: 15 BRPM | SYSTOLIC BLOOD PRESSURE: 118 MMHG | OXYGEN SATURATION: 96 % | DIASTOLIC BLOOD PRESSURE: 70 MMHG | BODY MASS INDEX: 38.3 KG/M2 | HEART RATE: 74 BPM | TEMPERATURE: 97.5 F | HEIGHT: 67 IN

## 2018-09-04 DIAGNOSIS — F31.81 BIPOLAR 2 DISORDER, MAJOR DEPRESSIVE EPISODE (HCC): ICD-10-CM

## 2018-09-04 DIAGNOSIS — R53.83 OTHER FATIGUE: ICD-10-CM

## 2018-09-04 DIAGNOSIS — M72.2 PLANTAR FASCIITIS: ICD-10-CM

## 2018-09-04 DIAGNOSIS — Z13.6 SCREENING FOR CARDIOVASCULAR CONDITION: ICD-10-CM

## 2018-09-04 PROBLEM — Z13.220 SCREENING FOR HYPERLIPIDEMIA: Status: RESOLVED | Noted: 2018-07-30 | Resolved: 2018-09-04

## 2018-09-04 PROCEDURE — 99213 OFFICE O/P EST LOW 20 MIN: CPT | Performed by: NURSE PRACTITIONER

## 2018-09-04 NOTE — PROGRESS NOTES
cc:  Follow up fatigue      Subjective:     HPI:     Jg Thompson is a 33 y.o. female here to discuss the evaluation and management of:    Fatigue  Bipolar 2 disorder  States she is feeling somewhat better. States she was feeling depressed and not wanting to get out of bed or do anything productive. Felt like she was overwhelming herself and taking on too much. She has realized she cannot do this as these actions contribute to her fatigue and depression. States has made some changes and has tried to get out of the house, be more active with kids, getting organized. Is not going to commit to a lot of obligations.  States she did not get her labs done from her last visit.     Foot pain  States they are feeling better.  She was told she has heel spurs and plantar fasciitis from one podiatrist.  Then was told at some point something was wrong with her back but she does not feel it is her back that is causing her feet pain. Does not have back pain.  Has been taking Aleve for pain-this is helping. Has to be careful since her stomach was getting irritated when she took too much. She is a  and she works 4 days per week.    ROS:  Denies any Headache, Blurred Vision, Confusion, Chest pain,  Shortness of breath,  Abdominal pain, Changes of bowel or bladder, Lower ext edema, Fevers, Nights sweats, Weight Changes, Focal weakness or numbness.  All other systems are negative.  Foot pain, fatigue        Current Outpatient Prescriptions:   •  omeprazole (PRILOSEC) 20 MG delayed-release capsule, Take 20 mg by mouth every day., Disp: , Rfl:   •  celecoxib (CELEBREX) 200 MG Cap, Take 1 Cap by mouth 2 times a day., Disp: 60 Cap, Rfl: 6  •  fluoxetine (PROZAC) 20 MG Cap, Take 1 Cap by mouth every day., Disp: 90 Cap, Rfl: 3  •  varenicline (CHANTIX STARTING MONTH PAK) 0.5 MG X 11 & 1 MG X 42 tablet, On day 1-3 take 0.5mg daily, then on day 4-7 take 0.5mg twice daily, then take 1mg twice daily for 11 weeks., Disp: 56 Tab, Rfl:  "3  •  fluconazole (DIFLUCAN) 150 MG tablet, Take 1 Tab by mouth every day., Disp: 1 Tab, Rfl: 1  •  lamotrigine (LAMICTAL) 200 MG tablet, Take 1 Tab by mouth every day., Disp: 90 Tab, Rfl: 3    Allergies   Allergen Reactions   • Penicillins        Past Medical History:   Diagnosis Date   • Anxiety    • Depression    • Herpes     2006   • Lumbosacral radiculitis 1/30/2018   • Other fatigue 7/30/2018   • Substance abuse      Past Surgical History:   Procedure Laterality Date   • TONSILLECTOMY       Family History   Problem Relation Age of Onset   • Psychiatry Mother    • Thyroid Mother    • Hypertension Mother    • Diabetes Mother    • Hypertension Maternal Grandfather      Social History     Social History   • Marital status: Single     Spouse name: N/A   • Number of children: N/A   • Years of education: N/A     Occupational History   • Not on file.     Social History Main Topics   • Smoking status: Former Smoker     Types: Cigarettes   • Smokeless tobacco: Never Used      Comment: vape   • Alcohol use Yes      Comment: occasional   • Drug use: No      Comment: lasted used 2 yrs ago, IV user. every day.    • Sexual activity: Yes     Partners: Male     Other Topics Concern   • Not on file     Social History Narrative   • No narrative on file       Objective:     Vitals: /70   Pulse 74   Temp 36.4 °C (97.5 °F)   Resp 15   Ht 1.702 m (5' 7\")   Wt 110.7 kg (244 lb)   SpO2 96%   BMI 38.22 kg/m²    General: Alert, pleasant, NAD  HEENT: Normocephalic.    Heart: Regular rate and rhythm.  S1 and S2 normal.  No murmurs appreciated.  Respiratory: Normal respiratory effort.  Clear to auscultation bilaterally.  Skin: Warm, dry, no rashes.  Musculoskeletal: Gait is normal.  Moves all extremities well.  Extremities: No leg edema.   Neurological: No tremors, sensation grossly intact, gait is normal,   Psych:  Affect/mood is normal, judgement is good, memory is intact, grooming is appropriate.    Assessment/Plan: "     Jg was seen today for fatigue.    Diagnoses and all orders for this visit:    Other fatigue  Improving.  Re-ordered labs  -     CBC WITH DIFFERENTIAL; Future  -     TSH WITH REFLEX TO FT4; Future  -     COMP METABOLIC PANEL; Future    Bipolar 2 disorder, major depressive episode (HCC)  Chronic.  Continue taking her Prozac.  Continue going to counseling, meetings. Avoid over committing her self.     Plantar fasciitis  Improving.  Patient would like to see a new podiatrist.  Encouraged patient to get a second opinion if she would like.  Handouts provided for patient in regards to stretching and managing plantar fasciitis.  Advised patient also wears compression stockings since she is on her feet all day especially stretching her calves as she states that her Achilles tendon is also tight.    Screening for cardiovascular condition  -     LIPID PROFILE; Future           Health care Maintenance:     Return in about 4 months (around 1/4/2019), or if symptoms worsen or fail to improve.          Katie JOHN

## 2018-09-24 ENCOUNTER — OFFICE VISIT (OUTPATIENT)
Dept: URGENT CARE | Facility: CLINIC | Age: 33
End: 2018-09-24
Payer: COMMERCIAL

## 2018-09-24 VITALS
OXYGEN SATURATION: 95 % | RESPIRATION RATE: 14 BRPM | TEMPERATURE: 97.6 F | HEART RATE: 66 BPM | BODY MASS INDEX: 35.58 KG/M2 | DIASTOLIC BLOOD PRESSURE: 78 MMHG | WEIGHT: 234.8 LBS | SYSTOLIC BLOOD PRESSURE: 122 MMHG | HEIGHT: 68 IN

## 2018-09-24 DIAGNOSIS — K52.9 GASTROENTERITIS, INFECTIOUS, PRESUMED: ICD-10-CM

## 2018-09-24 PROCEDURE — 99202 OFFICE O/P NEW SF 15 MIN: CPT | Performed by: EMERGENCY MEDICINE

## 2018-09-24 ASSESSMENT — ENCOUNTER SYMPTOMS
SENSORY CHANGE: 0
SORE THROAT: 0
FOCAL WEAKNESS: 0
DIARRHEA: 1
HEADACHES: 1
NAUSEA: 1
BLOOD IN STOOL: 0
SHORTNESS OF BREATH: 0
VOMITING: 1
CHILLS: 1
ABDOMINAL PAIN: 1

## 2018-09-24 NOTE — LETTER
September 24, 2018       Patient: Jg Thompson   YOB: 1985   Date of Visit: 9/24/2018         To Whom It May Concern:    It is my medical opinion that Jg Thompson should not attend work today or tomorrow; may return without restriction in 2 days.      Sincerely,          Nehemiah Hinson M.D.  Electronically Signed

## 2018-09-25 NOTE — PROGRESS NOTES
Subjective:      Jg Thompson is a 33 y.o. female who presents with GI Problem; Fever; and Other (vomitting, diarrhea, cold sweats x 3-4 days)            Gastroenteritis    This is a new problem. Episode onset: 3 days. The problem has been rapidly improving. The emesis has an appearance of stomach contents. Maximum temperature: subjective, resolved. Associated symptoms include abdominal pain, chills, diarrhea and headaches. Pertinent negatives include no chest pain or URI. Risk factors include ill contacts. She has tried increased fluids for the symptoms. The treatment provided moderate relief.   Patient notes daughter with recent diarrheal illness.  Notes symptoms much improved today; needs a note for return to work as .    Review of Systems   Constitutional: Positive for chills and malaise/fatigue.   HENT: Negative for sore throat.    Respiratory: Negative for shortness of breath.    Cardiovascular: Negative for chest pain.   Gastrointestinal: Positive for abdominal pain, diarrhea, nausea and vomiting. Negative for blood in stool.   Genitourinary: Negative for dysuria, frequency, hematuria and urgency.        No vaginal discharge or bleeding. Denies pregnancy.   Skin: Negative for rash.   Neurological: Positive for headaches. Negative for sensory change and focal weakness.     PMH:  has a past medical history of Anxiety; Depression; Herpes; Lumbosacral radiculitis (1/30/2018); Other fatigue (7/30/2018); and Substance abuse. She also has no past medical history of Addisons disease ([facility]); Adrenal disorder ([facility]); Allergy; Anemia; Blood transfusion; CHF (congestive heart failure) ([facility]); Clotting disorder ([facility]); Cushings syndrome ([facility]); Diabetic neuropathy ([facility]); EMPHYSEMA; GERD (gastroesophageal reflux disease); Goiter; Headache(784.0); Heart attack (HCC); HIV (human immunodeficiency virus infection); Hyperlipidemia; IBD (inflammatory bowel disease); Kidney disease; Meningitis; Migraine; OSTEOPOROSIS;  "Parathyroid disorder (HCC); Pituitary disease (HCC); Sickle cell disease (HCC); Thyroid disease; Tuberculosis; Ulcer; or Urinary tract infection, site not specified.  MEDS:   Current Outpatient Prescriptions:   •  omeprazole (PRILOSEC) 20 MG delayed-release capsule, Take 20 mg by mouth every day., Disp: , Rfl:   •  celecoxib (CELEBREX) 200 MG Cap, Take 1 Cap by mouth 2 times a day., Disp: 60 Cap, Rfl: 6  •  fluoxetine (PROZAC) 20 MG Cap, Take 1 Cap by mouth every day., Disp: 90 Cap, Rfl: 3  •  varenicline (CHANTIX STARTING MONTH PAK) 0.5 MG X 11 & 1 MG X 42 tablet, On day 1-3 take 0.5mg daily, then on day 4-7 take 0.5mg twice daily, then take 1mg twice daily for 11 weeks., Disp: 56 Tab, Rfl: 3  •  fluconazole (DIFLUCAN) 150 MG tablet, Take 1 Tab by mouth every day., Disp: 1 Tab, Rfl: 1  •  lamotrigine (LAMICTAL) 200 MG tablet, Take 1 Tab by mouth every day., Disp: 90 Tab, Rfl: 3  ALLERGIES:   Allergies   Allergen Reactions   • Penicillins      SURGHX:   Past Surgical History:   Procedure Laterality Date   • TONSILLECTOMY       SOCHX:  reports that she has quit smoking. Her smoking use included Cigarettes. She has never used smokeless tobacco. She reports that she drinks alcohol. She reports that she does not use drugs.  FH: family history includes Diabetes in her mother; Hypertension in her maternal grandfather and mother; Psychiatry in her mother; Thyroid in her mother.       Objective:     /78 (BP Location: Right arm, Patient Position: Sitting, BP Cuff Size: Adult)   Pulse 66   Temp 36.4 °C (97.6 °F) (Temporal)   Resp 14   Ht 1.715 m (5' 7.5\")   Wt 106.5 kg (234 lb 12.8 oz)   SpO2 95%   BMI 36.23 kg/m²      Physical Exam   Constitutional: She is oriented to person, place, and time. Vital signs are normal. She appears well-developed and well-nourished. She is cooperative. She does not have a sickly appearance. She does not appear ill. No distress.   HENT:   Head: Normocephalic and atraumatic. "   Mouth/Throat: Oropharynx is clear and moist and mucous membranes are normal.   Eyes: Conjunctivae and lids are normal.   Neck: Phonation normal. Neck supple.   Cardiovascular: Normal rate, regular rhythm and normal heart sounds.    Pulmonary/Chest: Effort normal and breath sounds normal.   Abdominal: Soft. Bowel sounds are normal. She exhibits no distension. There is no hepatosplenomegaly. There is tenderness in the epigastric area. There is no rigidity, no rebound, no guarding, no CVA tenderness and negative Cabrera's sign.   Neurological: She is alert and oriented to person, place, and time. She exhibits normal muscle tone.   Skin: Skin is warm and dry.   Psychiatric: She has a normal mood and affect.               Assessment/Plan:     1. Gastroenteritis, infectious, presumed  Resolving; supportive care advised.

## 2018-10-10 DIAGNOSIS — F31.81 BIPOLAR 2 DISORDER, MAJOR DEPRESSIVE EPISODE (HCC): ICD-10-CM

## 2018-10-11 ENCOUNTER — TELEPHONE (OUTPATIENT)
Dept: MEDICAL GROUP | Facility: MEDICAL CENTER | Age: 33
End: 2018-10-11

## 2018-10-11 DIAGNOSIS — F31.81 BIPOLAR 2 DISORDER, MAJOR DEPRESSIVE EPISODE (HCC): ICD-10-CM

## 2018-10-11 RX ORDER — FLUOXETINE HYDROCHLORIDE 20 MG/1
40 CAPSULE ORAL DAILY
Qty: 60 CAP | Refills: 3 | Status: SHIPPED | OUTPATIENT
Start: 2018-10-11 | End: 2019-03-12 | Stop reason: SDUPTHER

## 2018-10-11 RX ORDER — FLUOXETINE HYDROCHLORIDE 20 MG/1
CAPSULE ORAL
Qty: 90 CAP | Refills: 0 | Status: SHIPPED | OUTPATIENT
Start: 2018-10-11 | End: 2018-10-11 | Stop reason: SDUPTHER

## 2018-10-11 NOTE — TELEPHONE ENCOUNTER
VOICEMAIL  1. Caller Name: Jg Thompson                      Call Back Number: [phone] (home)     2. Message: pt called and said she stop taking her Lamotrigine 2 weeks ago. She wants to start taking it again but she said that she has to start on a low dosage and then move up little by little?     3. Patient approves office to leave a detailed voicemail/MyChart message: N\A

## 2018-10-29 ENCOUNTER — HOSPITAL ENCOUNTER (OUTPATIENT)
Dept: LAB | Facility: MEDICAL CENTER | Age: 33
End: 2018-10-29
Attending: PHYSICIAN ASSISTANT
Payer: COMMERCIAL

## 2018-10-29 PROCEDURE — 87591 N.GONORRHOEAE DNA AMP PROB: CPT

## 2018-10-29 PROCEDURE — 87491 CHLMYD TRACH DNA AMP PROBE: CPT

## 2018-10-31 LAB
C TRACH DNA SPEC QL NAA+PROBE: NEGATIVE
N GONORRHOEA DNA SPEC QL NAA+PROBE: NEGATIVE
SPECIMEN SOURCE: NORMAL

## 2019-02-05 ENCOUNTER — OFFICE VISIT (OUTPATIENT)
Dept: MEDICAL GROUP | Facility: MEDICAL CENTER | Age: 34
End: 2019-02-05
Payer: COMMERCIAL

## 2019-02-05 VITALS
DIASTOLIC BLOOD PRESSURE: 90 MMHG | TEMPERATURE: 97.9 F | RESPIRATION RATE: 16 BRPM | HEART RATE: 83 BPM | WEIGHT: 242 LBS | HEIGHT: 67 IN | BODY MASS INDEX: 37.98 KG/M2 | OXYGEN SATURATION: 96 % | SYSTOLIC BLOOD PRESSURE: 130 MMHG

## 2019-02-05 DIAGNOSIS — M54.50 ACUTE MIDLINE LOW BACK PAIN WITHOUT SCIATICA: ICD-10-CM

## 2019-02-05 PROCEDURE — 99214 OFFICE O/P EST MOD 30 MIN: CPT | Performed by: NURSE PRACTITIONER

## 2019-02-05 RX ORDER — IBUPROFEN 800 MG/1
800 TABLET ORAL EVERY 8 HOURS PRN
Qty: 90 TAB | Refills: 1 | Status: SHIPPED | OUTPATIENT
Start: 2019-02-05 | End: 2020-05-29

## 2019-02-05 RX ORDER — METHYLPREDNISOLONE 4 MG/1
TABLET ORAL
Qty: 21 TAB | Refills: 0 | Status: SHIPPED | OUTPATIENT
Start: 2019-02-05 | End: 2019-02-15

## 2019-02-05 RX ORDER — TIZANIDINE 4 MG/1
4 TABLET ORAL 2 TIMES DAILY
Qty: 60 TAB | Refills: 1 | Status: SHIPPED | OUTPATIENT
Start: 2019-02-05 | End: 2020-05-29

## 2019-02-06 NOTE — PROGRESS NOTES
cc:  Back pain      Subjective:     HPI:     Jg Thompson is a 33 y.o. female here to discuss the evaluation and management of:    Back pain  This is a new problem.  Patient states that she is been having back pain for approximately 1 month.  It states that it initially started with her feet and then her hip started hurting her and then she gone to work and her lower back began to hurt.  Denies any trauma or injury.  She is a  and does carry heavy trays.  States that she did have an old Medrol Dosepak which she took and it helped her.  She is lost about 13 pounds, she is been doing some stretching and exercise.  She also states been going to a chiropractor which can be somewhat helpful for her.  She feels like her back is very stiff and has a hard time bending over.  She also makes note every now and then she will get the sensation of needles in her right foot on the lateral side.  Denies any loss of bowel or bladder function, no saddle anesthesia or foot drop.        ROS:  Denies any Headache, Blurred Vision, Confusion, Chest pain,  Shortness of breath,  Abdominal pain, Changes of bowel or bladder, Lower ext edema, Fevers, Nights sweats, Weight Changes, Focal weakness or numbness.  And all other systems are negative.Back pain        Current Outpatient Prescriptions:   •  tizanidine (ZANAFLEX) 4 MG Tab, Take 1 Tab by mouth 2 times a day., Disp: 60 Tab, Rfl: 1  •  ibuprofen (MOTRIN) 800 MG Tab, Take 1 Tab by mouth every 8 hours as needed., Disp: 90 Tab, Rfl: 1  •  MethylPREDNISolone (MEDROL DOSEPAK) 4 MG Tablet Therapy Pack, As directed on the packaging label., Disp: 21 Tab, Rfl: 0  •  FLUoxetine (PROZAC) 20 MG Cap, Take 2 Caps by mouth every day., Disp: 60 Cap, Rfl: 3  •  omeprazole (PRILOSEC) 20 MG delayed-release capsule, Take 20 mg by mouth every day., Disp: , Rfl:   •  lamotrigine (LAMICTAL) 200 MG tablet, Take 1 Tab by mouth every day., Disp: 90 Tab, Rfl: 3  •  varenicline (CHANTIX STARTING MONTH  "JOSE JUAN) 0.5 MG X 11 & 1 MG X 42 tablet, On day 1-3 take 0.5mg daily, then on day 4-7 take 0.5mg twice daily, then take 1mg twice daily for 11 weeks. (Patient not taking: Reported on 2/5/2019), Disp: 56 Tab, Rfl: 3  •  fluconazole (DIFLUCAN) 150 MG tablet, Take 1 Tab by mouth every day. (Patient not taking: Reported on 2/5/2019), Disp: 1 Tab, Rfl: 1    Allergies   Allergen Reactions   • Penicillins        Past Medical History:   Diagnosis Date   • Anxiety    • Depression    • Herpes     2006   • Lumbosacral radiculitis 1/30/2018   • Other fatigue 7/30/2018   • Substance abuse      Past Surgical History:   Procedure Laterality Date   • TONSILLECTOMY       Family History   Problem Relation Age of Onset   • Psychiatry Mother    • Thyroid Mother    • Hypertension Mother    • Diabetes Mother    • Hypertension Maternal Grandfather      Social History     Social History   • Marital status: Single     Spouse name: N/A   • Number of children: N/A   • Years of education: N/A     Occupational History   • Not on file.     Social History Main Topics   • Smoking status: Former Smoker     Types: Cigarettes   • Smokeless tobacco: Never Used      Comment: vape   • Alcohol use Yes      Comment: occasional   • Drug use: No      Comment: lasted used 2 yrs ago, IV user. every day.    • Sexual activity: Yes     Partners: Male     Other Topics Concern   • Not on file     Social History Narrative   • No narrative on file       Objective:     Vitals: /90   Pulse 83   Temp 36.6 °C (97.9 °F)   Resp 16   Ht 1.702 m (5' 7\")   Wt 109.8 kg (242 lb)   SpO2 96%   BMI 37.90 kg/m²    General: Alert, pleasant, NAD  HEENT: Normocephalic.    Skin: Warm, dry, no rashes.  Musculoskeletal: Gait is normal.  Moves all extremities well. Midline spine pain TTP.  Positive straight leg  Extremities: No leg edema. No discoloration  Neurological: No tremors, sensation grossly intact, gait is normal,   Psych:  Affect/mood is normal, judgement is good, " memory is intact, grooming is appropriate.    Assessment/Plan:     Jg was seen today for back pain and medication management.    Diagnoses and all orders for this visit:    Acute midline low back pain without sciatica  No loss of bowel or bladder function, foot drop or saddle anesthesia.  Slightly positive straight leg test.  Have discussed with patient that it be beneficial for her to initiate physical therapy and be taught proper stretching and exercise techniques.  Have discussed with her that we will do ibuprofen every 8 hours as needed with food, tizanidine twice a day as needed.  Have also ordered a Medrol Dosepak she states that this was helpful for her in the past.  I have cautioned against long-term use of steroids.  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  -     DX-LUMBAR SPINE-2 OR 3 VIEWS; Future  -     DX-HIP-BILATERAL-WITH PELVIS-2 VIEWS; Future  -     REFERRAL TO PHYSICAL THERAPY Reason for Therapy: Eval/Treat/Report  -     tizanidine (ZANAFLEX) 4 MG Tab; Take 1 Tab by mouth 2 times a day.  -     ibuprofen (MOTRIN) 800 MG Tab; Take 1 Tab by mouth every 8 hours as needed.  -     MethylPREDNISolone (MEDROL DOSEPAK) 4 MG Tablet Therapy Pack; As directed on the packaging label.        No Follow-up on file.          Katie KRAMER.

## 2019-02-15 ENCOUNTER — TELEPHONE (OUTPATIENT)
Dept: MEDICAL GROUP | Facility: MEDICAL CENTER | Age: 34
End: 2019-02-15

## 2019-02-15 ENCOUNTER — PHYSICAL THERAPY (OUTPATIENT)
Dept: PHYSICAL THERAPY | Facility: REHABILITATION | Age: 34
End: 2019-02-15
Attending: NURSE PRACTITIONER
Payer: COMMERCIAL

## 2019-02-15 DIAGNOSIS — M54.50 ACUTE MIDLINE LOW BACK PAIN WITHOUT SCIATICA: ICD-10-CM

## 2019-02-15 PROCEDURE — 97162 PT EVAL MOD COMPLEX 30 MIN: CPT

## 2019-02-15 RX ORDER — PREDNISONE 10 MG/1
TABLET ORAL
Qty: 60 TAB | Refills: 0 | Status: SHIPPED
Start: 2019-02-15 | End: 2019-03-12

## 2019-02-15 ASSESSMENT — ACTIVITIES OF DAILY LIVING (ADL): POOR_BALANCE: 1

## 2019-02-15 ASSESSMENT — ENCOUNTER SYMPTOMS: PAIN SCALE: 8

## 2019-02-15 NOTE — TELEPHONE ENCOUNTER
1. Caller Name: Jg Thompson                                         Call Back Number: [phone] (home)       Patient approves a detailed voicemail message: N\A    Pt called and was wondering if you could prescribe Prednisone for her back pain. She started PT today and now she is in more pain. She also said she will get her xrays done next week.  She would like to take a 6 week course.  6 pills a day for a week then,  5 pills a day for a week then,  4 pills a day for a week then,  3 pills a day for a week then,  2 pills a day for a week then,  1 pill a day for a week.  She stated that her Podiatry had her take it like this before when that dr treated her. She stated it helped and made the pain go away.

## 2019-02-19 ENCOUNTER — TELEPHONE (OUTPATIENT)
Dept: MEDICAL GROUP | Facility: MEDICAL CENTER | Age: 34
End: 2019-02-19

## 2019-02-19 DIAGNOSIS — M54.10 BACK PAIN WITH RADICULOPATHY: ICD-10-CM

## 2019-02-19 PROBLEM — E66.9 OBESITY (BMI 30-39.9): Status: RESOLVED | Noted: 2018-01-30 | Resolved: 2019-02-19

## 2019-02-19 NOTE — TELEPHONE ENCOUNTER
1. Caller Name: Jg Thompson                                         Call Back Number: [phone] (home)         Patient approves a detailed voicemail message: N\A    Pt called and was wondering if you can order the MRI of her back? She called insurance and they said she will only be responsible for her deductible.  She also stats that her back pain is worse. She started taking an old Rx of Gabapentin 300 mg at night. She is also taking ibuprofen and doing heat and cold compressions.   She is wondering if you recommenced anything else? She stated that the pain is very bad/ severe. Please advise.

## 2019-02-19 NOTE — TELEPHONE ENCOUNTER
Pt called back and was wondering if taking Gabapentin ok? She was wondering if its okay that she takes it 3 times a day or stay with once a day?

## 2019-02-20 ENCOUNTER — HOSPITAL ENCOUNTER (OUTPATIENT)
Dept: LAB | Facility: MEDICAL CENTER | Age: 34
End: 2019-02-20
Attending: NURSE PRACTITIONER
Payer: COMMERCIAL

## 2019-02-20 ENCOUNTER — PHYSICAL THERAPY (OUTPATIENT)
Dept: PHYSICAL THERAPY | Facility: REHABILITATION | Age: 34
End: 2019-02-20
Attending: NURSE PRACTITIONER
Payer: COMMERCIAL

## 2019-02-20 ENCOUNTER — HOSPITAL ENCOUNTER (OUTPATIENT)
Dept: RADIOLOGY | Facility: MEDICAL CENTER | Age: 34
End: 2019-02-20
Attending: NURSE PRACTITIONER
Payer: COMMERCIAL

## 2019-02-20 DIAGNOSIS — Z13.6 SCREENING FOR CARDIOVASCULAR CONDITION: ICD-10-CM

## 2019-02-20 DIAGNOSIS — M54.50 ACUTE MIDLINE LOW BACK PAIN WITHOUT SCIATICA: ICD-10-CM

## 2019-02-20 DIAGNOSIS — R53.83 OTHER FATIGUE: ICD-10-CM

## 2019-02-20 LAB
ALBUMIN SERPL BCP-MCNC: 4.2 G/DL (ref 3.2–4.9)
ALBUMIN/GLOB SERPL: 1.5 G/DL
ALP SERPL-CCNC: 37 U/L (ref 30–99)
ALT SERPL-CCNC: 15 U/L (ref 2–50)
ANION GAP SERPL CALC-SCNC: 6 MMOL/L (ref 0–11.9)
AST SERPL-CCNC: 12 U/L (ref 12–45)
BASOPHILS # BLD AUTO: 0.7 % (ref 0–1.8)
BASOPHILS # BLD: 0.09 K/UL (ref 0–0.12)
BILIRUB SERPL-MCNC: 0.3 MG/DL (ref 0.1–1.5)
BUN SERPL-MCNC: 12 MG/DL (ref 8–22)
CALCIUM SERPL-MCNC: 8.8 MG/DL (ref 8.5–10.5)
CHLORIDE SERPL-SCNC: 106 MMOL/L (ref 96–112)
CHOLEST SERPL-MCNC: 182 MG/DL (ref 100–199)
CO2 SERPL-SCNC: 27 MMOL/L (ref 20–33)
CREAT SERPL-MCNC: 0.84 MG/DL (ref 0.5–1.4)
EOSINOPHIL # BLD AUTO: 0.1 K/UL (ref 0–0.51)
EOSINOPHIL NFR BLD: 0.8 % (ref 0–6.9)
ERYTHROCYTE [DISTWIDTH] IN BLOOD BY AUTOMATED COUNT: 43.1 FL (ref 35.9–50)
GLOBULIN SER CALC-MCNC: 2.8 G/DL (ref 1.9–3.5)
GLUCOSE SERPL-MCNC: 82 MG/DL (ref 65–99)
HCT VFR BLD AUTO: 48 % (ref 37–47)
HDLC SERPL-MCNC: 62 MG/DL
HGB BLD-MCNC: 16 G/DL (ref 12–16)
IMM GRANULOCYTES # BLD AUTO: 0.06 K/UL (ref 0–0.11)
IMM GRANULOCYTES NFR BLD AUTO: 0.5 % (ref 0–0.9)
LDLC SERPL CALC-MCNC: 99 MG/DL
LYMPHOCYTES # BLD AUTO: 2.96 K/UL (ref 1–4.8)
LYMPHOCYTES NFR BLD: 22.9 % (ref 22–41)
MCH RBC QN AUTO: 29.5 PG (ref 27–33)
MCHC RBC AUTO-ENTMCNC: 33.3 G/DL (ref 33.6–35)
MCV RBC AUTO: 88.6 FL (ref 81.4–97.8)
MONOCYTES # BLD AUTO: 0.97 K/UL (ref 0–0.85)
MONOCYTES NFR BLD AUTO: 7.5 % (ref 0–13.4)
NEUTROPHILS # BLD AUTO: 8.76 K/UL (ref 2–7.15)
NEUTROPHILS NFR BLD: 67.6 % (ref 44–72)
NRBC # BLD AUTO: 0 K/UL
NRBC BLD-RTO: 0 /100 WBC
PLATELET # BLD AUTO: 404 K/UL (ref 164–446)
PMV BLD AUTO: 8.9 FL (ref 9–12.9)
POTASSIUM SERPL-SCNC: 3.5 MMOL/L (ref 3.6–5.5)
PROT SERPL-MCNC: 7 G/DL (ref 6–8.2)
RBC # BLD AUTO: 5.42 M/UL (ref 4.2–5.4)
SODIUM SERPL-SCNC: 139 MMOL/L (ref 135–145)
TRIGL SERPL-MCNC: 107 MG/DL (ref 0–149)
TSH SERPL DL<=0.005 MIU/L-ACNC: 0.94 UIU/ML (ref 0.38–5.33)
WBC # BLD AUTO: 12.9 K/UL (ref 4.8–10.8)

## 2019-02-20 PROCEDURE — 73521 X-RAY EXAM HIPS BI 2 VIEWS: CPT

## 2019-02-20 PROCEDURE — 85025 COMPLETE CBC W/AUTO DIFF WBC: CPT

## 2019-02-20 PROCEDURE — 84443 ASSAY THYROID STIM HORMONE: CPT

## 2019-02-20 PROCEDURE — 80053 COMPREHEN METABOLIC PANEL: CPT

## 2019-02-20 PROCEDURE — 97140 MANUAL THERAPY 1/> REGIONS: CPT

## 2019-02-20 PROCEDURE — 36415 COLL VENOUS BLD VENIPUNCTURE: CPT

## 2019-02-20 PROCEDURE — 97110 THERAPEUTIC EXERCISES: CPT

## 2019-02-20 PROCEDURE — 80061 LIPID PANEL: CPT

## 2019-02-20 PROCEDURE — 97012 MECHANICAL TRACTION THERAPY: CPT

## 2019-02-20 PROCEDURE — 72100 X-RAY EXAM L-S SPINE 2/3 VWS: CPT

## 2019-02-20 NOTE — OP THERAPY DAILY TREATMENT
"  Outpatient Physical Therapy  DAILY TREATMENT     [facility] Physical [address].  [address]  [place] [address]  Phone:  [phone]  Fax:  [phone]    Date: 02/20/2019    Patient: Jg Thompson  YOB: 1985  MRN: [id number]     Time Calculation  Start time: 1530  Stop time: 1620 Time Calculation (min): 50 minutes     Chief Complaint: Back Problem    Visit #: 2    SUBJECTIVE:  Pt states she has been prescribed, ibuprofen, gabapentin, and prednisone and has not really been helping. The exercises seem to aggravate her.    OBJECTIVE:  Current objective measures: difficulty peter TrA without RA activation, improves with cueing to sink belly button to spine and be more gentle          Therapeutic Exercises (CPT 16170):     1. LTR, 10 x 1    2. SKTC, 20\" x 2, w/ strap    3. Hamstring gldie, 10 x 1, w/ strap    4. ADIM, 5\" x 10 x 1    5. ADIM + hip adduction, 5\" x 10 x 1    6. ADIM + marchign, 10 x 1      Therapeutic Exercise Summary: HEP: LTR, SKTC, hamstring glide, ADIM    Therapeutic Treatments and Modalities:     1. Manual Therapy (CPT 00895), manual traction, grade 3, 20\" x 3 each. R hip caudal mob, grade 3, 20\" x 3.    2. Mechanical Traction (CPT 21474), 60/40lbs, 60/20\" x 10 min with MHP    Time-based treatments/modalities:  Manual therapy minutes (CPT 58320): 10 minutes  Therapeutic exercise minutes (CPT 96745): 20 minutes       Pain rating before treatment: 5  Pain rating after treatment: 6 (a little more sore)    ASSESSMENT:   Response to treatment: Pt presents with acute on chronic low back pain with referral into her R hip and R hip flexor tendinopathy. Pt given strap to help with supine stretches which pt stated made the stretches feel better. Pt given cues for more gentle TrA contraction and pt had less pain with abdominal exercises. Pt felt a little sore after traction, but felt a good stretch during.     PLAN/RECOMMENDATIONS:   Plan for treatment: therapy " treatment to continue next visit.  Planned interventions for next visit: continue with current treatment. Progress ADIM. Repeat traction. Try opening/neutral gap.

## 2019-02-25 ENCOUNTER — PHYSICAL THERAPY (OUTPATIENT)
Dept: PHYSICAL THERAPY | Facility: REHABILITATION | Age: 34
End: 2019-02-25
Attending: NURSE PRACTITIONER
Payer: COMMERCIAL

## 2019-02-25 DIAGNOSIS — M54.50 ACUTE MIDLINE LOW BACK PAIN WITHOUT SCIATICA: ICD-10-CM

## 2019-02-25 PROCEDURE — 97012 MECHANICAL TRACTION THERAPY: CPT

## 2019-02-25 PROCEDURE — 97110 THERAPEUTIC EXERCISES: CPT

## 2019-02-25 PROCEDURE — 97140 MANUAL THERAPY 1/> REGIONS: CPT

## 2019-02-25 NOTE — OP THERAPY DAILY TREATMENT
"  Outpatient Physical Therapy  DAILY TREATMENT     [facility] Therapy [address]  [address]  [place] [address]  Phone:  [phone]  Fax:  [phone]    Date: 02/25/2019    Patient: Jg Thompson  YOB: 1985  MRN: [id number]     Time Calculation  Start time: 1030  Stop time: 1123 Time Calculation (min): 53 minutes     Chief Complaint: Back Problem    Visit #: 3    SUBJECTIVE:  Pt reports she was very sore immediately after LV, but awoke the next day with sig gains in spinal mobility and less pain, more of an intense stiffness in the R hip.     OBJECTIVE:        Therapeutic Exercises (CPT 58529):     1. LTR, x 20, improved mobility to 70% of full.     2. SKTC, 20\" x 2, able to complete w/o strap    3. KTOS, 1 min ea side    4. Standard bridge, x 8 reps, Initially uncomfortable mainly due to anxiety, but improved with repetitions.     5. STS, x 10, discussed neutral spine and hip hinge. How to counter balance self with fwd step for fwd bending at work.       Therapeutic Exercise Summary: HEP: LTR, SKTC, hamstring glide, ADIM    Therapeutic Treatments and Modalities:     1. Manual Therapy (CPT 39187), Prone: lumbar PAs GII-III, sacral txn (light), R piriformis 'pump'    2. Mechanical Traction (CPT 61386), 60/40lbs, 60/20\" x 10 min with MHP    Time-based treatments/modalities:  Manual therapy minutes (CPT 08815): 10 minutes  Therapeutic exercise minutes (CPT 54805): 20 minutes       ASSESSMENT:   Response to treatment: Pt demonstrates improved AROM and decreased pain levels compared to last visit.  She has apprehension initiating movements that have been painful in the past, but bridges proving to be an appropriate challenge today.  Will need to regain lumbar extension- nervous to attempt ASHLEE.  Did not increase time on traction due to soreness after LV.     PLAN/RECOMMENDATIONS:   Plan for treatment: therapy treatment to continue next visit.  Planned interventions for next " visit: continue with current treatment.

## 2019-02-27 ENCOUNTER — PHYSICAL THERAPY (OUTPATIENT)
Dept: PHYSICAL THERAPY | Facility: REHABILITATION | Age: 34
End: 2019-02-27
Attending: NURSE PRACTITIONER
Payer: COMMERCIAL

## 2019-02-27 DIAGNOSIS — M54.50 ACUTE MIDLINE LOW BACK PAIN WITHOUT SCIATICA: ICD-10-CM

## 2019-02-27 PROCEDURE — 97110 THERAPEUTIC EXERCISES: CPT

## 2019-02-27 PROCEDURE — 97012 MECHANICAL TRACTION THERAPY: CPT

## 2019-02-27 NOTE — OP THERAPY DAILY TREATMENT
"  Outpatient Physical Therapy  DAILY TREATMENT     [facility] Physical [address].  [address]  [place] [address]  Phone:  [phone]  Fax:  [phone]    Date: 02/27/2019    Patient: Jg Thompson  YOB: 1985  MRN: [id number]     Time Calculation  Start time: 1430  Stop time: 1520 Time Calculation (min): 50 minutes     Chief Complaint: Back Problem    Visit #: 4    SUBJECTIVE:  Pt reports she is much better. She has less pain and more mobility. She has some soreness in her back after a full work shift, but not bad.     OBJECTIVE:        Therapeutic Exercises (CPT 70851):     1. LTR, x 20, improved mobility to 70% of full.     2. LTR w/ T arms, 10 x 1    3. Bridge, 10 x 1    4. Hamstring roll on ball, 10 x 1    5. Bridge on bal, 10 x 1    6. Cat/cow, 10 x 1    7. Bird/dog, 10 x 1      Therapeutic Exercise Summary: HEP: LTR, SKTC, hamstring glide, ADIM    Therapeutic Treatments and Modalities:     2. Mechanical Traction (CPT 10597), 65/45lbs, 60/20\" x 15 min with [facility]    Time-based treatments/modalities:  Therapeutic exercise minutes (CPT 40241): 25 minutes       ASSESSMENT:   Response to treatment: Pt demonstrates continued improvement in ROM and decreased fear with movement. Pt had difficulty maintaining neutral spine in quadruped position with BLE movement, but can reset with verbal and tactile cueing.     PLAN/RECOMMENDATIONS:   Plan for treatment: therapy treatment to continue next visit.  Planned interventions for next visit: continue with current treatment. Progress to hip hinge, sit<>stand, standing strength.       "

## 2019-03-04 ENCOUNTER — PHYSICAL THERAPY (OUTPATIENT)
Dept: PHYSICAL THERAPY | Facility: REHABILITATION | Age: 34
End: 2019-03-04
Attending: NURSE PRACTITIONER
Payer: COMMERCIAL

## 2019-03-04 DIAGNOSIS — M54.50 ACUTE MIDLINE LOW BACK PAIN WITHOUT SCIATICA: ICD-10-CM

## 2019-03-04 PROCEDURE — 97012 MECHANICAL TRACTION THERAPY: CPT

## 2019-03-04 PROCEDURE — 97110 THERAPEUTIC EXERCISES: CPT

## 2019-03-04 NOTE — OP THERAPY DAILY TREATMENT
"  Outpatient Physical Therapy  DAILY TREATMENT     [facility] Physical [address].  [address]  [place] [address]  Phone:  [phone]  Fax:  [phone]    Date: 03/04/2019    Patient: Jg Thompson  YOB: 1985  MRN: [id number]     Time Calculation  Start time: 0911  Stop time: 1009 Time Calculation (min): 58 minutes     Chief Complaint: Back Problem    Visit #: 5    SUBJECTIVE:  Pt 10 min late to appt. Continues to note improvement.  1/10 pain current.  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     OBJECTIVE:      Therapeutic Exercises (CPT 26912):     2. LTR, x 10 ea side    3. Ball bridge, 2x 1 min holds    4. Hamstring roll on ball, 10 x 2    6. Cat/cow, 10 x 1    7. Bird/dog, 15 x 1    8. STS with focus on hip hinge, 2x 10    9. TB pullback, L2 2 x 20    10. Extended re-ed of standing posture: glute and TA engagement. , x 10 min      Therapeutic Exercise Summary: HEP: LTR, SKTC, hamstring glide, ADIM    Therapeutic Treatments and Modalities:     2. Mechanical Traction (CPT 31159), 65/45lbs, 60/20\" x 15 min with MHP    Time-based treatments/modalities:  Therapeutic exercise minutes (CPT 60280): 40 minutes       ASSESSMENT:   Response to treatment: Pt is progressing very well overall.  Improved awareness of neutral spine in supine and quad positions, but stands with habitual sway back/knee hyperextension and lack of glute engagement.  Able to self correct after cuing today, but will need further core stab progressions to decrease risk of reinjury.     PLAN/RECOMMENDATIONS:   Plan for treatment: therapy treatment to continue next visit.  Planned interventions for next visit: continue with current treatment.      "

## 2019-03-11 ENCOUNTER — APPOINTMENT (OUTPATIENT)
Dept: PHYSICAL THERAPY | Facility: REHABILITATION | Age: 34
End: 2019-03-11
Attending: NURSE PRACTITIONER
Payer: COMMERCIAL

## 2019-03-12 ENCOUNTER — OFFICE VISIT (OUTPATIENT)
Dept: MEDICAL GROUP | Facility: MEDICAL CENTER | Age: 34
End: 2019-03-12
Payer: COMMERCIAL

## 2019-03-12 ENCOUNTER — APPOINTMENT (OUTPATIENT)
Dept: PHYSICAL THERAPY | Facility: REHABILITATION | Age: 34
End: 2019-03-12
Attending: NURSE PRACTITIONER
Payer: COMMERCIAL

## 2019-03-12 VITALS
DIASTOLIC BLOOD PRESSURE: 70 MMHG | TEMPERATURE: 97.7 F | WEIGHT: 243 LBS | HEART RATE: 82 BPM | OXYGEN SATURATION: 98 % | HEIGHT: 67 IN | RESPIRATION RATE: 16 BRPM | BODY MASS INDEX: 38.14 KG/M2 | SYSTOLIC BLOOD PRESSURE: 102 MMHG

## 2019-03-12 DIAGNOSIS — L60.0 INGROWN TOENAIL OF LEFT FOOT: ICD-10-CM

## 2019-03-12 DIAGNOSIS — M54.50 ACUTE MIDLINE LOW BACK PAIN WITHOUT SCIATICA: ICD-10-CM

## 2019-03-12 DIAGNOSIS — F31.81 BIPOLAR 2 DISORDER, MAJOR DEPRESSIVE EPISODE (HCC): ICD-10-CM

## 2019-03-12 DIAGNOSIS — B35.1 ONYCHOMYCOSIS: ICD-10-CM

## 2019-03-12 PROCEDURE — 99214 OFFICE O/P EST MOD 30 MIN: CPT | Performed by: NURSE PRACTITIONER

## 2019-03-12 RX ORDER — LAMOTRIGINE 200 MG/1
200 TABLET ORAL DAILY
Qty: 90 TAB | Refills: 3 | Status: SHIPPED | OUTPATIENT
Start: 2019-03-12 | End: 2020-05-29

## 2019-03-12 RX ORDER — FLUOXETINE HYDROCHLORIDE 20 MG/1
40 CAPSULE ORAL DAILY
Qty: 60 CAP | Refills: 11 | Status: SHIPPED | OUTPATIENT
Start: 2019-03-12 | End: 2020-05-29

## 2019-03-12 RX ORDER — TERBINAFINE HYDROCHLORIDE 250 MG/1
250 TABLET ORAL DAILY
Qty: 30 TAB | Refills: 2 | Status: SHIPPED | OUTPATIENT
Start: 2019-03-12 | End: 2020-05-29

## 2019-03-12 NOTE — PROGRESS NOTES
cc:  Follow up back pain/toenail      Subjective:     HPI:     Jg Zuluaga Jay is a 34 y.o. female here to discuss the evaluation and management of:    Back pain  Improved with steroids, anti-inflammatories as well as physical therapy. Has only take Ibuprofen once a week lately. Doing PT once a week. Still on light duty at her job.  She is able to function a bit more since her last visit.    Left great toe ingrown toenail  She does have an ingrown toenail on her left big toe.  She does have a podiatrist-has yet to make an appointment.    Toenail fungus  On bilateral great toes left worse than right toe.     Bipolar  Patient states that she is gone off her lamotrigine as well as her fluoxetine.  Patient states that she feels like if she does not have the fluoxetine without the Lamictal she is very tamez.  She continues to go to stop therapy.  Reason she went off she states is because she was taking so many other medications to help with her back pain that she felt like she was going crazy.  However she does make mention that she needs to start back up again because of her moods.      ROS:  Denies any Headache, Blurred Vision, Confusion, Chest pain,  Shortness of breath,  Abdominal pain, Changes of bowel or bladder, Lower ext edema, Fevers, Nights sweats, Weight Changes, Focal weakness or numbness.  And all other systems are negative.        Current Outpatient Prescriptions:   •  terbinafine (LAMISIL) 250 MG Tab, Take 1 Tab by mouth every day., Disp: 30 Tab, Rfl: 2  •  lamotrigine (LAMICTAL) 200 MG tablet, Take 1 Tab by mouth every day., Disp: 90 Tab, Rfl: 3  •  FLUoxetine (PROZAC) 20 MG Cap, Take 2 Caps by mouth every day., Disp: 60 Cap, Rfl: 11  •  tizanidine (ZANAFLEX) 4 MG Tab, Take 1 Tab by mouth 2 times a day., Disp: 60 Tab, Rfl: 1  •  ibuprofen (MOTRIN) 800 MG Tab, Take 1 Tab by mouth every 8 hours as needed., Disp: 90 Tab, Rfl: 1  •  omeprazole (PRILOSEC) 20 MG delayed-release capsule, Take 20 mg by mouth  "every day., Disp: , Rfl:   •  varenicline (CHANTIX STARTING MONTH JOSE JUAN) 0.5 MG X 11 & 1 MG X 42 tablet, On day 1-3 take 0.5mg daily, then on day 4-7 take 0.5mg twice daily, then take 1mg twice daily for 11 weeks., Disp: 56 Tab, Rfl: 3    Allergies   Allergen Reactions   • Penicillins        Past Medical History:   Diagnosis Date   • Anxiety    • Depression    • Herpes     2006   • Lumbosacral radiculitis 1/30/2018   • Other fatigue 7/30/2018   • Substance abuse (HCC)      Past Surgical History:   Procedure Laterality Date   • TONSILLECTOMY       Family History   Problem Relation Age of Onset   • Psychiatry Mother    • Thyroid Mother    • Hypertension Mother    • Diabetes Mother    • Hypertension Maternal Grandfather      Social History     Social History   • Marital status: Single     Spouse name: N/A   • Number of children: N/A   • Years of education: N/A     Occupational History   • Not on file.     Social History Main Topics   • Smoking status: Former Smoker     Types: Cigarettes   • Smokeless tobacco: Never Used      Comment: vape   • Alcohol use Yes      Comment: occasional   • Drug use: No      Comment: lasted used 2 yrs ago, IV user. every day.    • Sexual activity: Yes     Partners: Male     Other Topics Concern   • Not on file     Social History Narrative   • No narrative on file       Objective:     Vitals: /70   Pulse 82   Temp 36.5 °C (97.7 °F)   Resp 16   Ht 1.702 m (5' 7\")   Wt 110.2 kg (243 lb)   SpO2 98%   BMI 38.06 kg/m²    General: Alert, pleasant, NAD  HEENT: Normocephalic.    Skin: Warm, dry, no rashes.  Bilateral thick, yellow nails on the great toes.  Bilateral great toes with ingrown toenails and curved toenails.  Tenderness to great toe.  Musculoskeletal: Gait is normal.  Moves all extremities well.  Extremities: No leg edema. No discoloration  Neurological: No tremors, sensation grossly intact, gait is normal,   Psych:  Affect/mood is normal, judgement is good, tangential and " pressured speech memory is intact, grooming is appropriate.    Assessment/Plan:     Jg was seen today for nail problem and medication management.    Diagnoses and all orders for this visit:    Onychomycosis  Patient is interested in starting the Lamisil.  We will have her repeat a CMP after 1 month of taking have cautioned against liver function.  -     terbinafine (LAMISIL) 250 MG Tab; Take 1 Tab by mouth every day.  -     Comp Metabolic Panel; Future    Ingrown toenail of left foot  Have discussed with her that she can start soaking her feet in some Epson salts and try to avoid shoes that are a constricting and cause trauma to the nail.  Have also encouraged her to follow-up with her podiatrist for possible intervention.    Bipolar 2 disorder, major depressive episode (HCC)  Have discussed with patient that she needs to start her medications again.  She reports she is having fluctuations in her moods.  She appears very manic and having pressured speech at this time.  Have started off by taking the lamotrigine as well as the fluoxetine.  She will follow back up to assess.  -     lamotrigine (LAMICTAL) 200 MG tablet; Take 1 Tab by mouth every day.  -     FLUoxetine (PROZAC) 20 MG Cap; Take 2 Caps by mouth every day.    Acute midline low back pain without sciatica  Improving. Continue with PT and home exercising/stretching that she has been instructed to do from PT.       No Follow-up on file.          Katie KRAMER.

## 2019-03-13 ENCOUNTER — PHYSICAL THERAPY (OUTPATIENT)
Dept: PHYSICAL THERAPY | Facility: REHABILITATION | Age: 34
End: 2019-03-13
Attending: NURSE PRACTITIONER
Payer: COMMERCIAL

## 2019-03-13 DIAGNOSIS — M54.50 ACUTE MIDLINE LOW BACK PAIN WITHOUT SCIATICA: ICD-10-CM

## 2019-03-13 PROCEDURE — 97012 MECHANICAL TRACTION THERAPY: CPT

## 2019-03-13 PROCEDURE — 97110 THERAPEUTIC EXERCISES: CPT

## 2019-03-13 NOTE — OP THERAPY DAILY TREATMENT
"  Outpatient Physical Therapy  DAILY TREATMENT     [facility] Physical [address].  [address]  [place] [address]  Phone:  [phone]  Fax:  [phone]    Date: 03/13/2019    Patient: Jg Thompson  YOB: 1985  MRN: [id number]     Time Calculation  Start time: 1205  Stop time: 1250 Time Calculation (min): 45 minutes     Chief Complaint: Back Problem    Visit #: 6    SUBJECTIVE:  Pt states she is done with the steroid. She has felt like a 1/10 pain in her back but is nervous now that she is not on the steroids that her pain will come back. She would like to try rowing this week.    OBJECTIVE:      Therapeutic Exercises (CPT 27100):     1. Rows, green, 10 x 1    2. Shuttle DL, 10 x 1, VCs for abd engagement    3. Squats, 10 x 1, VCs for neutral spine    4. Standing abd iso with arm movement, up/down, 10 x 1, out/back, 10 x 1, circles (fwd/lat), 10 x 1 each      Therapeutic Exercise Summary: HEP: LTR, SKTC, hamstring glide, ADIM    Therapeutic Treatments and Modalities:     2. Mechanical Traction (CPT 80844), 70/50lbs, 60/20\" x 15 min with MHP    Time-based treatments/modalities:  Therapeutic exercise minutes (CPT 86562): 25 minutes       ASSESSMENT:   Response to treatment: Pt is progressing very well overall. Neutral spine was re-emphasized and pt is able to correct with minimal verbal cueing. Spoke to pt about positioning with rowing and starting light. Discussed different mindfulness techniques to prevent anxiety with low increases in pain: deep breathing, focus on hands, tell self she is stronger and a little soreness is better and expected.    PLAN/RECOMMENDATIONS:   Plan for treatment: therapy treatment to continue next visit.  Planned interventions for next visit: continue with current treatment. 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      "

## 2019-03-15 ENCOUNTER — APPOINTMENT (OUTPATIENT)
Dept: PHYSICAL THERAPY | Facility: REHABILITATION | Age: 34
End: 2019-03-15
Attending: NURSE PRACTITIONER
Payer: COMMERCIAL

## 2019-03-18 ENCOUNTER — APPOINTMENT (OUTPATIENT)
Dept: PHYSICAL THERAPY | Facility: REHABILITATION | Age: 34
End: 2019-03-18
Attending: NURSE PRACTITIONER
Payer: COMMERCIAL

## 2019-03-20 ENCOUNTER — APPOINTMENT (OUTPATIENT)
Dept: RADIOLOGY | Facility: MEDICAL CENTER | Age: 34
End: 2019-03-20
Attending: NURSE PRACTITIONER
Payer: COMMERCIAL

## 2019-03-22 ENCOUNTER — APPOINTMENT (OUTPATIENT)
Dept: PHYSICAL THERAPY | Facility: REHABILITATION | Age: 34
End: 2019-03-22
Attending: NURSE PRACTITIONER
Payer: COMMERCIAL

## 2019-03-25 ENCOUNTER — APPOINTMENT (OUTPATIENT)
Dept: PHYSICAL THERAPY | Facility: REHABILITATION | Age: 34
End: 2019-03-25
Attending: NURSE PRACTITIONER
Payer: COMMERCIAL

## 2019-03-27 ENCOUNTER — APPOINTMENT (OUTPATIENT)
Dept: PHYSICAL THERAPY | Facility: REHABILITATION | Age: 34
End: 2019-03-27
Attending: NURSE PRACTITIONER
Payer: COMMERCIAL

## 2019-04-01 ENCOUNTER — TELEPHONE (OUTPATIENT)
Dept: MEDICAL GROUP | Facility: MEDICAL CENTER | Age: 34
End: 2019-04-01

## 2019-04-01 ENCOUNTER — APPOINTMENT (OUTPATIENT)
Dept: PHYSICAL THERAPY | Facility: REHABILITATION | Age: 34
End: 2019-04-01
Attending: NURSE PRACTITIONER
Payer: COMMERCIAL

## 2019-04-01 NOTE — TELEPHONE ENCOUNTER
1. Caller Name: Jg Thompson                                         Call Back Number: [phone] (home)       Patient approves a detailed voicemail message: yes    Patient would like another MRI scheduled because she is still inquiring back pain.

## 2019-04-04 ENCOUNTER — APPOINTMENT (OUTPATIENT)
Dept: PHYSICAL THERAPY | Facility: REHABILITATION | Age: 34
End: 2019-04-04
Attending: NURSE PRACTITIONER
Payer: COMMERCIAL

## 2019-04-10 ENCOUNTER — PHYSICAL THERAPY (OUTPATIENT)
Dept: PHYSICAL THERAPY | Facility: REHABILITATION | Age: 34
End: 2019-04-10
Attending: NURSE PRACTITIONER
Payer: COMMERCIAL

## 2019-04-10 DIAGNOSIS — M54.50 ACUTE MIDLINE LOW BACK PAIN WITHOUT SCIATICA: ICD-10-CM

## 2019-04-10 PROCEDURE — 97110 THERAPEUTIC EXERCISES: CPT

## 2019-04-10 PROCEDURE — 97012 MECHANICAL TRACTION THERAPY: CPT

## 2019-04-10 NOTE — OP THERAPY DAILY TREATMENT
"  Outpatient Physical Therapy  DAILY TREATMENT     [facility] Physical Therapy [address]  [address]  [place] [address]  Phone:  [phone]  Fax:  [phone]    Date: 04/10/2019    Patient: Jg Thompson  YOB: 1985  MRN: [id number]     Time Calculation  Start time: 1130  Stop time: 1228 Time Calculation (min): 58 minutes     Chief Complaint: No chief complaint on file.    Visit #: 7    SUBJECTIVE:  Feels like she has not been progressing significantly, limited in attendance and HEP due to home/life stressors.  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  States the symptoms were stable again by the next day.       OBJECTIVE:      Therapeutic Exercises (CPT 26952):     1. Ball bridge, hold x 2 min!    2. Side plank, from knees, 2x 15\" ea side    3. Fwd plank, 18\" surface, x 1 min. Floor x 25\"    4. TB OH reach from lateral stance for SB stabilizers, L3 x 10 reps ea    5. Chops, L3 x 10 ea      Therapeutic Exercise Summary: HEP: LTR, SKTC, hamstring glide, ADIM    Therapeutic Treatments and Modalities:     2. Mechanical Traction (CPT 83012), 70/50lbs, 60/20\" x 15 min with MHP    Time-based treatments/modalities:  Therapeutic exercise minutes (CPT 48945): 35 minutes       ASSESSMENT:   Response to treatment: Despite break in therapy, pt is still found to be improving.  Able to add load to functional/rotational motions without pain.  She has increased EDGARDO at home and did have some adverse response, but short lasting.     PLAN/RECOMMENDATIONS:   Plan for treatment: therapy treatment to continue next visit.  Planned interventions for next visit: continue with current treatment. Update POC, progress functional training to tolerance.       "

## 2019-04-17 ENCOUNTER — PHYSICAL THERAPY (OUTPATIENT)
Dept: PHYSICAL THERAPY | Facility: REHABILITATION | Age: 34
End: 2019-04-17
Attending: NURSE PRACTITIONER
Payer: COMMERCIAL

## 2019-04-17 DIAGNOSIS — M54.50 ACUTE MIDLINE LOW BACK PAIN WITHOUT SCIATICA: ICD-10-CM

## 2019-04-17 PROCEDURE — 97110 THERAPEUTIC EXERCISES: CPT

## 2019-04-17 NOTE — OP THERAPY DAILY TREATMENT
Outpatient Physical Therapy  DAILY TREATMENT     [facility] Physical [address].  [address]  [place] [address]  Phone:  [phone]  Fax:  [phone]    Date: 04/17/2019    Patient: Jg Thompson  YOB: 1985  MRN: [id number]     Time Calculation  Start time: 1130  Stop time: 1200 Time Calculation (min): 30 minutes     Chief Complaint: Back Problem    Visit #: 8    SUBJECTIVE:  Pt feels good today. Still has slight pain in her R hip, like a 1/10 Vas, but is able to work, cook, and clean without any issues. She is working on getting back to a normal routine.      OBJECTIVE:       Lx flexion: reaches toes  Lx ROM: WNL and painfree  Squat: good form  Working: not carrying full trays  Work out: starting to get back into it     Short Term Goals:   1. Pt is to be able to reach toes in forward flexion. MET  2. Pt is to be able to perform squat with good form and without pain. MET  3. Pt is to perform HEP without cueing demonstrating compliance. MET  Short term goal time span:  2-4 weeks     Long Term Goals:   1. Pt is to be able to return to work without increase in baseline pain. PROGRESING  2. Pt is to be able to return to exercise routine without increase in baseline pain. PROGRESSING  3. Pt is to have full painfree ROM into all directions of the lx spine. MET  Long term goal time span:  6-8 weeks    Therapeutic Exercises (CPT 20379):       Therapeutic Exercise Summary: Reviewed HEP. Pt instructed to return to normal life and workout routine. Pt instructed to return to normal exercise slowly and advance if tolerated or decrease if too much. Spoke to pt about returning to early exercises if has a flare up.       Time-based treatments/modalities:  Therapeutic exercise minutes (CPT 68718): 25 minutes       ASSESSMENT:   Response to treatment: Pt presented with low back pain, but has been able to progress to working, cooking, and cleaning with minimal increase in pain. Pt  instructed to return to normal workout routine as tolerated and educated in self-progression/regression. Pt feels comfortable d/c to HEP.    PLAN/RECOMMENDATIONS:   Pt to d/c to HEP

## 2019-04-17 NOTE — OP THERAPY DISCHARGE SUMMARY
Outpatient Physical Therapy  DISCHARGE SUMMARY NOTE      [facility] Physical Therapy [address]  [address]  [place] [address]  Phone:  [phone]  Fax:  [phone]    Date of Visit: 04/17/2019    Patient: Jg Thompson  YOB: 1985  MRN: [id number]     Referring Provider: DORA Walton [facility] [address]  [place] [address]   Referring Diagnosis Low back pain [M54.5]     Physical Therapy Occurrence Codes    Date of onset of impairment:  2/5/19   Date physical therapy care plan established or reviewed:  2/15/19   Date physical therapy treatment started:  2/15/19          Functional Limitations and Severity Modifiers      Goal:     Discharge:         Your patient is being discharged from Physical Therapy with the following comments:   · Goals met    Lx flexion: reaches toes  Lx ROM: WNL and painfree  Squat: good form  Working: not carrying full trays  Work out: starting to get back into it     Short Term Goals:   1. Pt is to be able to reach toes in forward flexion. MET  2. Pt is to be able to perform squat with good form and without pain. MET  3. Pt is to perform HEP without cueing demonstrating compliance. MET  Short term goal time span:  2-4 weeks     Long Term Goals:   1. Pt is to be able to return to work without increase in baseline pain. PROGRESING  2. Pt is to be able to return to exercise routine without increase in baseline pain. PROGRESSING  3. Pt is to have full painfree ROM into all directions of the lx spine. MET  Long term goal time span:  6-8 weeks    Comments:  Pt presented with low back pain, but has been able to progress to working, cooking, and cleaning with minimal increase in pain. Pt has overall decreased pain and improved lx ROM since eval. Pt instructed to return to normal workout routine as tolerated and educated in self-progression/regression. Pt feels comfortable d/c to HEP.     Limitations Remaining:  Slight discomfort in R  hip    Recommendations:  Pt to d/c to HEP and normal workout routine    Liat No, PT, DPT    Date: 4/17/2019

## 2019-04-24 ENCOUNTER — APPOINTMENT (OUTPATIENT)
Dept: PHYSICAL THERAPY | Facility: REHABILITATION | Age: 34
End: 2019-04-24
Attending: NURSE PRACTITIONER
Payer: COMMERCIAL

## 2019-04-30 ENCOUNTER — HOSPITAL ENCOUNTER (OUTPATIENT)
Dept: RADIOLOGY | Facility: MEDICAL CENTER | Age: 34
End: 2019-04-30
Attending: NURSE PRACTITIONER
Payer: COMMERCIAL

## 2019-04-30 DIAGNOSIS — M54.10 BACK PAIN WITH RADICULOPATHY: ICD-10-CM

## 2019-04-30 PROCEDURE — 72148 MRI LUMBAR SPINE W/O DYE: CPT

## 2019-05-01 ENCOUNTER — APPOINTMENT (OUTPATIENT)
Dept: PHYSICAL THERAPY | Facility: REHABILITATION | Age: 34
End: 2019-05-01
Attending: NURSE PRACTITIONER
Payer: COMMERCIAL

## 2019-05-08 ENCOUNTER — APPOINTMENT (OUTPATIENT)
Dept: PHYSICAL THERAPY | Facility: REHABILITATION | Age: 34
End: 2019-05-08
Attending: NURSE PRACTITIONER
Payer: COMMERCIAL

## 2019-05-15 ENCOUNTER — APPOINTMENT (OUTPATIENT)
Dept: PHYSICAL THERAPY | Facility: REHABILITATION | Age: 34
End: 2019-05-15
Attending: NURSE PRACTITIONER
Payer: COMMERCIAL

## 2020-05-21 ENCOUNTER — HOSPITAL ENCOUNTER (OUTPATIENT)
Facility: MEDICAL CENTER | Age: 35
End: 2020-05-21
Payer: COMMERCIAL

## 2020-05-29 ENCOUNTER — APPOINTMENT (OUTPATIENT)
Dept: RADIOLOGY | Facility: IMAGING CENTER | Age: 35
End: 2020-05-29
Attending: NURSE PRACTITIONER
Payer: COMMERCIAL

## 2020-05-29 ENCOUNTER — OFFICE VISIT (OUTPATIENT)
Dept: URGENT CARE | Facility: CLINIC | Age: 35
End: 2020-05-29
Payer: COMMERCIAL

## 2020-05-29 ENCOUNTER — NURSE TRIAGE (OUTPATIENT)
Dept: HEALTH INFORMATION MANAGEMENT | Facility: OTHER | Age: 35
End: 2020-05-29

## 2020-05-29 VITALS
TEMPERATURE: 99.1 F | BODY MASS INDEX: 37.35 KG/M2 | RESPIRATION RATE: 16 BRPM | DIASTOLIC BLOOD PRESSURE: 80 MMHG | OXYGEN SATURATION: 98 % | SYSTOLIC BLOOD PRESSURE: 110 MMHG | WEIGHT: 238 LBS | HEART RATE: 80 BPM | HEIGHT: 67 IN

## 2020-05-29 DIAGNOSIS — R05.9 COUGH: ICD-10-CM

## 2020-05-29 PROCEDURE — 99214 OFFICE O/P EST MOD 30 MIN: CPT | Performed by: NURSE PRACTITIONER

## 2020-05-29 PROCEDURE — 71046 X-RAY EXAM CHEST 2 VIEWS: CPT | Mod: TC | Performed by: NURSE PRACTITIONER

## 2020-05-29 ASSESSMENT — ENCOUNTER SYMPTOMS
DIZZINESS: 0
CHILLS: 0
MYALGIAS: 0
SHORTNESS OF BREATH: 1
RHINORRHEA: 0
VOMITING: 0
SWEATS: 0
NAUSEA: 0
COUGH: 1
EYE PAIN: 0
FEVER: 0
SORE THROAT: 0
HEADACHES: 0

## 2020-05-29 ASSESSMENT — FIBROSIS 4 INDEX: FIB4 SCORE: 0.27

## 2020-05-29 NOTE — TELEPHONE ENCOUNTER
"1. Caller Name: Nils                 Call Back Number: [phone]  [facility] PCP or Specialty Provider: Yes [place]        2.  Does patient have any new or worsening symptoms of respiratory illness? Yes, the patient reports the following respiratory symptoms: cough and shortness of breath or difficulty breathing.    3.  Does patient have any comoribidities? None     4.  Has the patient traveled in the last 14 days OR had any known contact with someone who is suspected or confirmed to have COVID-19?  No.    5. Disposition: Advised to go to     Note routed to [facility] Provider: [facility] only.      Patient stopped smoking but started \"chain vaping\". She reports her chest started hurting and a little cough.  Patient takes mucinex with some help.    Tested for covid on the 21st with negative results.    Reason for Disposition  • MILD difficulty breathing (e.g., minimal/no SOB at rest, SOB with walking, pulse <100)  • Chest pain    Answer Assessment - Initial Assessment Questions  1. COVID-19 DIAGNOSIS: \"Who made your Coronavirus (COVID-19) diagnosis?\" \"Was it confirmed by a positive lab test?\" If not diagnosed by a HCP, ask \"Are there lots of cases (community spread) where you live?\" (See public health department website, if unsure)    * MAJOR community spread: high number of cases; numbers of cases are increasing; many people hospitalized.    * MINOR community spread: low number of cases; not increasing; few or no people hospitalized      No  2. ONSET: \"When did the COVID-19 symptoms start?\"      3 days ago  3. WORST SYMPTOM: \"What is your worst symptom?\" (e.g., cough, fever, shortness of breath, muscle aches)      Cough and pain in chest  4. COUGH: \"How bad is the cough?\"       Cough hurts chest  5. FEVER: \"Do you have a fever?\" If so, ask: \"What is your temperature, how was it measured, and when did it start?\"   none  6. RESPIRATORY STATUS: \"Describe your breathing?\" (e.g., shortness of breath, wheezing, unable to " "speak)       Hurts to take a deep breath  7. BETTER-SAME-WORSE: \"Are you getting better, staying the same or getting worse compared to yesterday?\"  If getting worse, ask, \"In what way?\"      Staying the same  8. HIGH RISK DISEASE: \"Do you have any chronic medical problems?\" (e.g., asthma, heart or lung disease, weak immune system, etc.)  No  9. PREGNANCY: \"Is there any chance you are pregnant?\" \"When was your last menstrual period?\"      Didn't ask  10. OTHER SYMPTOMS: \"Do you have any other symptoms?\"  (e.g., runny nose, headache, sore throat, loss of smell)       None    Protocols used: CORONAVIRUS (COVID-19) DIAGNOSED OR [id number]      "

## 2020-05-29 NOTE — PROGRESS NOTES
"Subjective:   Humbertoseb Thompson  is a 35 y.o. female who presents for Chest Pain (x1 week, dry cough, has been vaping a lot more than usual )        Cough   This is a new problem. The current episode started in the past 7 days. The problem has been gradually worsening. The problem occurs constantly. The cough is non-productive. Associated symptoms include chest pain and shortness of breath. Pertinent negatives include no chills, fever, headaches, myalgias, postnasal drip, rash, rhinorrhea, sore throat or sweats. Associated symptoms comments: Stress, anxiety   . Nothing aggravates the symptoms. Risk factors: vaping. She has tried OTC cough suppressant for the symptoms. The treatment provided mild relief. There is no history of asthma, bronchitis or pneumonia.   Was tested from COVID-19.  Test results were negative as of 5/20 patient without fever.    Review of Systems   Constitutional: Negative for chills and fever.   HENT: Negative for postnasal drip, rhinorrhea and sore throat.    Eyes: Negative for pain.   Respiratory: Positive for cough and shortness of breath.    Cardiovascular: Positive for chest pain.   Gastrointestinal: Negative for nausea and vomiting.   Genitourinary: Negative for hematuria.   Musculoskeletal: Negative for myalgias.   Skin: Negative for rash.   Neurological: Negative for dizziness and headaches.     Allergies   Allergen Reactions   • Penicillins       Objective:   /80 (Patient Position: Sitting)   Pulse 80   Temp 37.3 °C (99.1 °F) (Temporal)   Resp 16   Ht 1.702 m (5' 7\")   Wt 108 kg (238 lb)   SpO2 98%   BMI 37.28 kg/m²   Physical Exam  Vitals signs and nursing note reviewed.   Constitutional:       General: She is not in acute distress.     Appearance: She is well-developed.   HENT:      Head: Normocephalic and atraumatic.      Right Ear: Tympanic membrane and external ear normal.      Left Ear: Tympanic membrane and external ear normal.      Nose: Nose normal.      Right " Sinus: No maxillary sinus tenderness or frontal sinus tenderness.      Left Sinus: No maxillary sinus tenderness or frontal sinus tenderness.      Mouth/Throat:      Mouth: Mucous membranes are moist.      Pharynx: Uvula midline. No posterior oropharyngeal erythema.      Tonsils: No tonsillar exudate or tonsillar abscesses.   Eyes:      General:         Right eye: No discharge.         Left eye: No discharge.      Conjunctiva/sclera: Conjunctivae normal.   Cardiovascular:      Rate and Rhythm: Normal rate.   Pulmonary:      Effort: Pulmonary effort is normal. No respiratory distress.      Breath sounds: Normal breath sounds.   Abdominal:      General: There is no distension.   Musculoskeletal: Normal range of motion.   Skin:     General: Skin is warm and dry.   Neurological:      General: No focal deficit present.      Mental Status: She is alert and oriented to person, place, and time. Mental status is at baseline.      Gait: Gait (gait at baseline) normal.   Psychiatric:         Attention and Perception: Attention normal.         Mood and Affect: Mood is anxious.         Speech: Speech normal.         Behavior: Behavior normal.         Thought Content: Thought content normal.         Cognition and Memory: Cognition normal.         Judgment: Judgment normal.           Assessment/Plan:     1. Cough  DX-CHEST-2 VIEWS     Xray results  No active disease.    Patient is a 35-year-old female present with stated above, lung sounds clear to auscultation bilaterally, pulse ox adequate, x-ray negative,, test negative as of 5/20 no bacterial etiology suspected.  Encouraged symptomatic care.  Patient will continue taking Mucinex.  Patient  given precautionary s/sx that mandate immediate follow up and evaluation in the ED. Advised of risks of not doing so.  Side effects of the above medications discussed.   DDX, Supportive care, and indications for immediate follow-up discussed with patient.    Instructed to return to clinic or  nearest emergency department if we are not available for any change in condition, further concerns, or worsening of symptoms.    The patient  demonstrated a good understanding and agreed with the treatment plan.    Please note that this dictation was created using voice recognition software. I have made every reasonable attempt to correct obvious errors, but I expect that there are errors of grammar and possibly content that I did not discover before finalizing the note.

## 2020-06-11 LAB
SARS-COV-2 RNA SPEC QL NAA+PROBE: NOT DETECTED
SPECIMEN SOURCE: NORMAL

## 2020-07-19 ENCOUNTER — HOSPITAL ENCOUNTER (EMERGENCY)
Facility: MEDICAL CENTER | Age: 35
End: 2020-07-19
Attending: EMERGENCY MEDICINE
Payer: COMMERCIAL

## 2020-07-19 VITALS
RESPIRATION RATE: 16 BRPM | BODY MASS INDEX: 33.98 KG/M2 | HEART RATE: 88 BPM | WEIGHT: 224.21 LBS | DIASTOLIC BLOOD PRESSURE: 84 MMHG | HEIGHT: 68 IN | OXYGEN SATURATION: 96 % | SYSTOLIC BLOOD PRESSURE: 139 MMHG | TEMPERATURE: 98.2 F

## 2020-07-19 DIAGNOSIS — S09.90XA CLOSED HEAD INJURY, INITIAL ENCOUNTER: ICD-10-CM

## 2020-07-19 DIAGNOSIS — M54.2 NECK PAIN: ICD-10-CM

## 2020-07-19 DIAGNOSIS — S61.217A LACERATION OF LEFT LITTLE FINGER WITHOUT FOREIGN BODY WITHOUT DAMAGE TO NAIL, INITIAL ENCOUNTER: ICD-10-CM

## 2020-07-19 DIAGNOSIS — S06.0X0A CONCUSSION WITHOUT LOSS OF CONSCIOUSNESS, INITIAL ENCOUNTER: ICD-10-CM

## 2020-07-19 PROCEDURE — 99281 EMR DPT VST MAYX REQ PHY/QHP: CPT

## 2020-07-19 PROCEDURE — 700111 HCHG RX REV CODE 636 W/ 250 OVERRIDE (IP): Performed by: EMERGENCY MEDICINE

## 2020-07-19 PROCEDURE — 90471 IMMUNIZATION ADMIN: CPT

## 2020-07-19 PROCEDURE — 90715 TDAP VACCINE 7 YRS/> IM: CPT | Performed by: EMERGENCY MEDICINE

## 2020-07-19 RX ADMIN — CLOSTRIDIUM TETANI TOXOID ANTIGEN (FORMALDEHYDE INACTIVATED), CORYNEBACTERIUM DIPHTHERIAE TOXOID ANTIGEN (FORMALDEHYDE INACTIVATED), BORDETELLA PERTUSSIS TOXOID ANTIGEN (GLUTARALDEHYDE INACTIVATED), BORDETELLA PERTUSSIS FILAMENTOUS HEMAGGLUTININ ANTIGEN (FORMALDEHYDE INACTIVATED), BORDETELLA PERTUSSIS PERTACTIN ANTIGEN, AND BORDETELLA PERTUSSIS FIMBRIAE 2/3 ANTIGEN 0.5 ML: 5; 2; 2.5; 5; 3; 5 INJECTION, SUSPENSION INTRAMUSCULAR at 02:52

## 2020-07-19 ASSESSMENT — FIBROSIS 4 INDEX: FIB4 SCORE: 0.27

## 2020-07-19 NOTE — ED TRIAGE NOTES
Jg Zuluaga Jay   35 y.o. female   Chief Complaint   Patient presents with   • Digit Pain     Right Pinky Finger   • Neck Pain   • Headache      The patient presents to the ED via triage for evaluation right pinky finger laceration. She reports that she was attempting to shave and inadvertently cut herself. She describes that it has been bleeding excessively before coming to the department. It currently has a bandage and the bleeding is controlled.     The patient also reports that she fell out of a lawn chair approximately one week ago and hit her head and neck. She denies LOC but reports that she has had a headache, photosensitivity and difficulty focusing since it happened.    For these reasons, the patient presents for evaluation.    Pt amb to triage with steady gait for above complaint.  Pt is alert and oriented, speaking in full sentences, follows commands and responds appropriately to questions. NAD. Resp are even and unlabored.   Pt placed in lobby. Pt educated on triage process. Pt encouraged to alert staff for any changes.

## 2020-07-19 NOTE — ED PROVIDER NOTES
ED Provider Note    Scribed for Mehul Powell M.D. by Rosi Au. 7/19/2020  2:13 AM    Primary care provider: DORA Walton  Means of arrival: Walk-in  History obtained from: Patient  History limited by: None    CHIEF COMPLAINT  Chief Complaint   Patient presents with   • Digit Pain     Right Pinky Finger   • Neck Pain   • Headache       HPI  Changsuzannawest Thompson is a 35 y.o. female who presents to the Emergency Department for evaluation of a finger laceration that occurred around 2 pm yesterday while she was shaving with a razor. She states that it has been bleeding excessively and she has been having difficulty managing the bleeding. Her tetanus is not up to date.     She additionally reports that on Wednesday she fell out of a chair and hit her head. She did not lose consciousness, but has been having neck pain, photophobia, difficulty focusing, and a headache. Her symptoms have been improving improving over the last day though.     REVIEW OF SYSTEMS  Pertinent positives include: finger laceration, headache, neck pain, difficulty focusing, and photophobia. Pertinent negatives include: no loss of consciousness. See history of present illness. All other systems are negative.     PAST MEDICAL HISTORY   has a past medical history of Anxiety, Depression, Herpes, Lumbosacral radiculitis (1/30/2018), Other fatigue (7/30/2018), and Substance abuse ([facility]).    SURGICAL HISTORY   has a past surgical history that includes tonsillectomy.    SOCIAL HISTORY  Social History     Tobacco Use   • Smoking status: Former Smoker     Types: Cigarettes   • Smokeless tobacco: Never Used   • Tobacco comment: vape   Substance Use Topics   • Alcohol use: Yes     Comment: occasional   • Drug use: No     Types: Methamphetamines     Comment: lasted used 2 yrs ago, IV user. every day.       Social History     Substance and Sexual Activity   Drug Use No   • Types: Methamphetamines    Comment: lasted used 2 yrs ago, IV  "user. every day.        FAMILY HISTORY  Family History   Problem Relation Age of Onset   • Psychiatric Illness Mother    • Thyroid Mother    • Hypertension Mother    • Diabetes Mother    • Hypertension Maternal Grandfather        CURRENT MEDICATIONS  No current facility-administered medications for this encounter.     Current Outpatient Medications:   •  omeprazole (PRILOSEC) 20 MG delayed-release capsule, Take 20 mg by mouth every day., Disp: , Rfl:      ALLERGIES  Allergies   Allergen Reactions   • Penicillins        PHYSICAL EXAM  VITAL SIGNS: /84   Pulse 88   Temp 36.8 °C (98.2 °F) (Temporal)   Resp 16   Ht 1.727 m (5' 8\")   Wt 101.7 kg (224 lb 3.3 oz)   SpO2 96%   BMI 34.09 kg/m²     Constitutional: Alert in no apparent distress.  HENT: No signs of trauma, Bilateral external ears normal, Nose normal. Uvula midline.   Eyes: Pupils are equal and reactive, Conjunctiva normal, Non-icteric.   Neck: Normal range of motion, No tenderness, Supple, No stridor.   Lymphatic: No lymphadenopathy noted.   Cardiovascular: Regular rate and rhythm, no murmurs.   Thorax & Lungs: Normal breath sounds, No respiratory distress, No wheezing, No chest tenderness.   Abdomen:  Soft, No tenderness, No peritoneal signs, No masses, No pulsatile masses.   Skin: Small avulsion to distal tip of left palmar fifth digit. Warm, Dry, No erythema, No rash.   Back: No bony tenderness, No CVA tenderness.   Extremities: Intact distal pulses, No edema, No tenderness, No cyanosis.  Musculoskeletal: Good range of motion in all major joints. No tenderness to palpation or major deformities noted.   Neurologic: Alert , Normal motor function, Normal sensory function, No focal deficits noted. Cranial nerves II through XII intact.  5 out of 5 strength x4.  Sensation intact light touch.  Normal finger-nose-finger.  Normal reflexes bilaterally.  No clonus. EOMI. PERRL.   Psychiatric: Affect normal, Judgment normal, Mood normal.     DIAGNOSTIC " STUDIES / PROCEDURES        COURSE & MEDICAL DECISION MAKING  Nursing notes, VS, PMSFHx reviewed in chart.    35 y.o. female p/w chief complaint of finger laceration.    2:13 AM Patient seen and examined at bedside. Patient's tetanus updated. Discussed neuro exam and informed her that findings are reassuring. Return precautions were discussed with the patient, and they were cleared for discharge at this time. Patient was understanding and agreeable to discharge.    I verified that the patient was wearing a mask and I was wearing appropriate PPE every time I entered the room. The patient's mask was on the patient at all times during my encounter except for a brief view of the oropharynx.     The differential diagnoses include but are not limited to:   Tdap given as patient does not know her last tetanus vaccine  Wound irrigated and dressing applied    No hx or PE e/o ICH, pt is U[place] head CT neg therefor elected to not obtain imaging at this time  Pt w/ no midline c/s pain and Uz[place] c/s rule neg  No C/T/L spine TTP, doubt fx  No obvious extremity injury  No intrathoracic or abd pain to suggest PTX, rib fx or BAT  Pt ambulates w/o assistance and w/ steady gait prior to d/c  Pt tolerating PO prior to dc    The patient will return for new or worsening symptoms and is stable at the time of discharge.    The patient is referred to a primary physician for blood pressure management, diabetic screening, and for all other preventative health concerns.    DISPOSITION:  Patient will be discharged home in stable condition.    FOLLOW UP:  OLAMIDE WaltonRWongN.  [address]  [facility] [address]  [facility] [address] [phone]    In 1 week  For wound re-check    [facility], Emergency Dept  [address] [facility] [address] [phone]    If symptoms worsen      OUTPATIENT MEDICATIONS:  New Prescriptions    No medications on file       FINAL IMPRESSION  1. Neck pain    2. Closed head injury,  initial encounter    3. Concussion without loss of consciousness, initial encounter    4. Laceration of left little finger without foreign body without damage to nail, initial encounter          I, Rosi Au (Scribodin), am scribing for, and in the presence of, Mehul Powell M.D..    Electronically signed by: Rosi Au (Scribe), 7/19/2020    I, Mehul Powell M.D. personally performed the services described in this documentation, as scribed by Rosi Au in my presence, and it is both accurate and complete. E    The note accurately reflects work and decisions made by me.  Mehul Powell M.D.  7/19/2020  6:50 AM

## 2020-07-28 ENCOUNTER — HOSPITAL ENCOUNTER (OUTPATIENT)
Dept: LAB | Facility: MEDICAL CENTER | Age: 35
End: 2020-07-28
Attending: NURSE PRACTITIONER
Payer: COMMERCIAL

## 2020-07-28 ENCOUNTER — HOSPITAL ENCOUNTER (OUTPATIENT)
Facility: MEDICAL CENTER | Age: 35
End: 2020-07-28
Attending: NURSE PRACTITIONER
Payer: COMMERCIAL

## 2020-07-28 ENCOUNTER — OFFICE VISIT (OUTPATIENT)
Dept: MEDICAL GROUP | Facility: MEDICAL CENTER | Age: 35
End: 2020-07-28
Payer: COMMERCIAL

## 2020-07-28 VITALS
HEART RATE: 79 BPM | WEIGHT: 220.02 LBS | DIASTOLIC BLOOD PRESSURE: 84 MMHG | HEIGHT: 67 IN | TEMPERATURE: 97.9 F | SYSTOLIC BLOOD PRESSURE: 126 MMHG | OXYGEN SATURATION: 100 % | BODY MASS INDEX: 34.53 KG/M2

## 2020-07-28 DIAGNOSIS — Z11.3 SCREEN FOR STD (SEXUALLY TRANSMITTED DISEASE): ICD-10-CM

## 2020-07-28 DIAGNOSIS — E66.9 OBESITY (BMI 30-39.9): ICD-10-CM

## 2020-07-28 DIAGNOSIS — M70.52 INFRAPATELLAR BURSITIS OF LEFT KNEE: ICD-10-CM

## 2020-07-28 DIAGNOSIS — Z13.6 SCREENING FOR CARDIOVASCULAR CONDITION: ICD-10-CM

## 2020-07-28 DIAGNOSIS — Z30.09 COUNSELING FOR BIRTH CONTROL REGARDING INTRAUTERINE DEVICE (IUD): ICD-10-CM

## 2020-07-28 DIAGNOSIS — R82.90 FOUL SMELLING URINE: ICD-10-CM

## 2020-07-28 DIAGNOSIS — N30.01 ACUTE CYSTITIS WITH HEMATURIA: ICD-10-CM

## 2020-07-28 DIAGNOSIS — F31.81 BIPOLAR 2 DISORDER, MAJOR DEPRESSIVE EPISODE (HCC): ICD-10-CM

## 2020-07-28 DIAGNOSIS — Z71.3 WEIGHT LOSS COUNSELING, ENCOUNTER FOR: ICD-10-CM

## 2020-07-28 LAB
ALBUMIN SERPL BCP-MCNC: 4.4 G/DL (ref 3.2–4.9)
ALBUMIN/GLOB SERPL: 1.8 G/DL
ALP SERPL-CCNC: 54 U/L (ref 30–99)
ALT SERPL-CCNC: 26 U/L (ref 2–50)
ANION GAP SERPL CALC-SCNC: 15 MMOL/L (ref 7–16)
APPEARANCE UR: NORMAL
AST SERPL-CCNC: 17 U/L (ref 12–45)
BILIRUB SERPL-MCNC: 0.5 MG/DL (ref 0.1–1.5)
BILIRUB UR STRIP-MCNC: NEGATIVE MG/DL
BUN SERPL-MCNC: 15 MG/DL (ref 8–22)
CALCIUM SERPL-MCNC: 8.8 MG/DL (ref 8.5–10.5)
CHLORIDE SERPL-SCNC: 104 MMOL/L (ref 96–112)
CHOLEST SERPL-MCNC: 157 MG/DL (ref 100–199)
CO2 SERPL-SCNC: 22 MMOL/L (ref 20–33)
COLOR UR AUTO: YELLOW
CREAT SERPL-MCNC: 0.65 MG/DL (ref 0.5–1.4)
FASTING STATUS PATIENT QL REPORTED: NORMAL
GLOBULIN SER CALC-MCNC: 2.4 G/DL (ref 1.9–3.5)
GLUCOSE SERPL-MCNC: 77 MG/DL (ref 65–99)
GLUCOSE UR STRIP.AUTO-MCNC: NEGATIVE MG/DL
HCV AB SER QL: NORMAL
HDLC SERPL-MCNC: 66 MG/DL
HIV 1+2 AB+HIV1 P24 AG SERPL QL IA: NORMAL
KETONES UR STRIP.AUTO-MCNC: 15 MG/DL
LDLC SERPL CALC-MCNC: 80 MG/DL
LEUKOCYTE ESTERASE UR QL STRIP.AUTO: NORMAL
NITRITE UR QL STRIP.AUTO: POSITIVE
PH UR STRIP.AUTO: 5.5 [PH] (ref 5–8)
POTASSIUM SERPL-SCNC: 4 MMOL/L (ref 3.6–5.5)
PROT SERPL-MCNC: 6.8 G/DL (ref 6–8.2)
PROT UR QL STRIP: NEGATIVE MG/DL
RBC UR QL AUTO: NORMAL
SODIUM SERPL-SCNC: 141 MMOL/L (ref 135–145)
SP GR UR STRIP.AUTO: 1.01
TREPONEMA PALLIDUM IGG+IGM AB [PRESENCE] IN SERUM OR PLASMA BY IMMUNOASSAY: NORMAL
TRIGL SERPL-MCNC: 57 MG/DL (ref 0–149)
TSH SERPL DL<=0.005 MIU/L-ACNC: 0.6 UIU/ML (ref 0.38–5.33)
UROBILINOGEN UR STRIP-MCNC: 0.2 MG/DL

## 2020-07-28 PROCEDURE — 80061 LIPID PANEL: CPT

## 2020-07-28 PROCEDURE — 87389 HIV-1 AG W/HIV-1&-2 AB AG IA: CPT

## 2020-07-28 PROCEDURE — 86803 HEPATITIS C AB TEST: CPT

## 2020-07-28 PROCEDURE — 87086 URINE CULTURE/COLONY COUNT: CPT

## 2020-07-28 PROCEDURE — 87480 CANDIDA DNA DIR PROBE: CPT

## 2020-07-28 PROCEDURE — 87186 SC STD MICRODIL/AGAR DIL: CPT

## 2020-07-28 PROCEDURE — 87660 TRICHOMONAS VAGIN DIR PROBE: CPT

## 2020-07-28 PROCEDURE — 99214 OFFICE O/P EST MOD 30 MIN: CPT | Performed by: NURSE PRACTITIONER

## 2020-07-28 PROCEDURE — 87077 CULTURE AEROBIC IDENTIFY: CPT

## 2020-07-28 PROCEDURE — 87591 N.GONORRHOEAE DNA AMP PROB: CPT

## 2020-07-28 PROCEDURE — 87510 GARDNER VAG DNA DIR PROBE: CPT

## 2020-07-28 PROCEDURE — 80053 COMPREHEN METABOLIC PANEL: CPT

## 2020-07-28 PROCEDURE — 81002 URINALYSIS NONAUTO W/O SCOPE: CPT | Performed by: NURSE PRACTITIONER

## 2020-07-28 PROCEDURE — 87491 CHLMYD TRACH DNA AMP PROBE: CPT

## 2020-07-28 PROCEDURE — 84443 ASSAY THYROID STIM HORMONE: CPT

## 2020-07-28 PROCEDURE — 36415 COLL VENOUS BLD VENIPUNCTURE: CPT

## 2020-07-28 PROCEDURE — 99000 SPECIMEN HANDLING OFFICE-LAB: CPT | Performed by: NURSE PRACTITIONER

## 2020-07-28 PROCEDURE — 86780 TREPONEMA PALLIDUM: CPT

## 2020-07-28 RX ORDER — PHENTERMINE HYDROCHLORIDE 15 MG/1
15 CAPSULE ORAL EVERY MORNING
Qty: 30 CAP | Refills: 0 | Status: SHIPPED | OUTPATIENT
Start: 2020-07-28 | End: 2020-08-27

## 2020-07-28 RX ORDER — PHENTERMINE HYDROCHLORIDE 15 MG/1
15 CAPSULE ORAL EVERY MORNING
COMMUNITY
End: 2020-07-28 | Stop reason: SDUPTHER

## 2020-07-28 RX ORDER — NITROFURANTOIN 25; 75 MG/1; MG/1
100 CAPSULE ORAL 2 TIMES DAILY
Qty: 10 CAP | Refills: 0 | Status: SHIPPED | OUTPATIENT
Start: 2020-07-28 | End: 2021-01-01

## 2020-07-28 ASSESSMENT — FIBROSIS 4 INDEX: FIB4 SCORE: 0.27
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
## 2020-07-29 LAB
C TRACH DNA SPEC QL NAA+PROBE: NEGATIVE
CANDIDA DNA VAG QL PROBE+SIG AMP: NEGATIVE
G VAGINALIS DNA VAG QL PROBE+SIG AMP: NEGATIVE
N GONORRHOEA DNA SPEC QL NAA+PROBE: NEGATIVE
SPECIMEN SOURCE: NORMAL
T VAGINALIS DNA VAG QL PROBE+SIG AMP: NEGATIVE

## 2020-07-31 LAB
BACTERIA UR CULT: ABNORMAL
BACTERIA UR CULT: ABNORMAL
SIGNIFICANT IND 70042: ABNORMAL
SITE SITE: ABNORMAL
SOURCE SOURCE: ABNORMAL

## 2020-10-15 ENCOUNTER — HOSPITAL ENCOUNTER (OUTPATIENT)
Facility: MEDICAL CENTER | Age: 35
End: 2020-10-15
Attending: OBSTETRICS & GYNECOLOGY
Payer: COMMERCIAL

## 2020-10-15 ENCOUNTER — GYNECOLOGY VISIT (OUTPATIENT)
Dept: OBGYN | Facility: CLINIC | Age: 35
End: 2020-10-15
Payer: COMMERCIAL

## 2020-10-15 VITALS
WEIGHT: 211.7 LBS | BODY MASS INDEX: 33.23 KG/M2 | SYSTOLIC BLOOD PRESSURE: 121 MMHG | HEIGHT: 67 IN | DIASTOLIC BLOOD PRESSURE: 78 MMHG

## 2020-10-15 DIAGNOSIS — R30.0 DYSURIA: ICD-10-CM

## 2020-10-15 DIAGNOSIS — Z30.433 ENCOUNTER FOR REMOVAL AND REINSERTION OF INTRAUTERINE CONTRACEPTIVE DEVICE (IUD): ICD-10-CM

## 2020-10-15 DIAGNOSIS — E66.9 CLASS 1 OBESITY WITH BODY MASS INDEX (BMI) OF 32.0 TO 32.9 IN ADULT, UNSPECIFIED OBESITY TYPE, UNSPECIFIED WHETHER SERIOUS COMORBIDITY PRESENT: ICD-10-CM

## 2020-10-15 DIAGNOSIS — N39.3 STRESS INCONTINENCE OF URINE: ICD-10-CM

## 2020-10-15 DIAGNOSIS — Z11.3 SCREEN FOR SEXUALLY TRANSMITTED DISEASES: ICD-10-CM

## 2020-10-15 DIAGNOSIS — Z30.49 ENCOUNTER FOR SURVEILLANCE OF OTHER CONTRACEPTIVE: ICD-10-CM

## 2020-10-15 LAB
APPEARANCE UR: CLEAR
BILIRUB UR STRIP-MCNC: NORMAL MG/DL
COLOR UR AUTO: YELLOW
GLUCOSE UR STRIP.AUTO-MCNC: NEGATIVE MG/DL
KETONES UR STRIP.AUTO-MCNC: NORMAL MG/DL
LEUKOCYTE ESTERASE UR QL STRIP.AUTO: NORMAL
NITRITE UR QL STRIP.AUTO: NEGATIVE
PH UR STRIP.AUTO: 7 [PH] (ref 5–8)
PROT UR QL STRIP: NEGATIVE MG/DL
RBC UR QL AUTO: NORMAL
SP GR UR STRIP.AUTO: 1.02
UROBILINOGEN UR STRIP-MCNC: NORMAL MG/DL

## 2020-10-15 PROCEDURE — 58300 INSERT INTRAUTERINE DEVICE: CPT | Performed by: OBSTETRICS & GYNECOLOGY

## 2020-10-15 PROCEDURE — 81002 URINALYSIS NONAUTO W/O SCOPE: CPT | Performed by: OBSTETRICS & GYNECOLOGY

## 2020-10-15 PROCEDURE — 58301 REMOVE INTRAUTERINE DEVICE: CPT | Performed by: OBSTETRICS & GYNECOLOGY

## 2020-10-15 PROCEDURE — 99214 OFFICE O/P EST MOD 30 MIN: CPT | Mod: 25 | Performed by: OBSTETRICS & GYNECOLOGY

## 2020-10-15 ASSESSMENT — FIBROSIS 4 INDEX: FIB4 SCORE: 0.29

## 2020-10-15 NOTE — NON-PROVIDER
Pt here annual exam  Pt states that she would like STD screening, poss UTI and discuss IUD. Pelvic fullness.   Good# [phone]  Pharmacy verified

## 2020-10-15 NOTE — PROGRESS NOTES
"GYN Visit  CC: STD screening, IUD consultation    Jg Margareth Thompson is a 35 y.o.  who presents today for STD screening.  She reports that she was not sexually active for quite some time and then she has lost about 50 pounds in the last 3 years and she has recently become much more sexually active.  Reports that she has had about 5 partners in the last 6 months.  She was not consistently using condoms with all of these partners.  She has been utilizing the Mirena IUD for contraception but it is about to  and she would like to have another one placed.  She has been very happy with this method of contraception.  Does not want to be tested for STDs and involve a blood draw but is interested in vaginal swabs.  Reports that she also occasionally experiences leaking of urine during sex.  She wonders what can be done to help with this.  She does not know how to do Kegel exercises.  Also reports that for the last couple of days she has become \"paranoid\" that she might have a urinary tract infection.  Occasionally experiences some burning with urination.  Denies any fevers, chills, suprapubic pain.    GYN History:  No LMP recorded. (Menstrual status: Other).. Menarche @11.  Menses regular but now has IUD - has minimal spotting.  Last pap ,  has h/o abnormal pap with colpo in , normal paps since.  No history of cone biopsy, LEEP or any other cervical, uterine or gynecologic surgery. H/o gonorrhea, chlamydia, and herpes.  Hasn't had herpes outbreak in a long time.  Currently sexually active with multiple male partners (about 5 in past 6 months).  Utilizing IUD for contraception.     OB History:    OB History    Para Term  AB Living   5 2 2   2 2   SAB TAB Ectopic Molar Multiple Live Births     2       2      # Outcome Date GA Lbr Alejandro/2nd Weight Sex Delivery Anes PTL Lv   5 TAB 2004 6w0d             Birth Comments: 1 Day D&C   4 Term 10/06/02 40w0d  3.232 kg (7 lb 2 oz) F Vag-Spont EPI N FIDELINA "      Birth Comments: denies complications   3 TAB  5w0d             Birth Comments: 1 day D&c   2 Term 10/16/01 40w0d  2.92 kg (6 lb 7 oz) M Vag-Spont EPI N FIDELINA      Birth Comments: denies complications.    1                Birth Comments: System Generated. Please review and update pregnancy details.       Review of Systems:   Pertinent positives documented in HPI and all other systems reviewed & are negative.    All PMH, PSH, allergies, social history and FH reviewed and updated today:  Past Medical History:  Past Medical History:   Diagnosis Date   • Anxiety    • Depression    • Herpes        • Lumbosacral radiculitis 2018   • Other fatigue 2018   • Substance abuse (HCC)      Past Surgical History:  Past Surgical History:   Procedure Laterality Date   • TONSILLECTOMY       Medications:   Current Outpatient Medications Ordered in Epic   Medication Sig Dispense Refill   • nitrofurantoin (MACROBID) 100 MG Cap Take 1 Cap by mouth 2 times a day. (Patient not taking: Reported on 10/15/2020) 10 Cap 0     No current Epic-ordered facility-administered medications on file.        Allergies: Penicillins    Social History:  Social History     Socioeconomic History   • Marital status: Single     Spouse name: Not on file   • Number of children: Not on file   • Years of education: Not on file   • Highest education level: Not on file   Occupational History   • Not on file   Social Needs   • Financial resource strain: Not on file   • Food insecurity     Worry: Not on file     Inability: Not on file   • Transportation needs     Medical: Not on file     Non-medical: Not on file   Tobacco Use   • Smoking status: Former Smoker     Types: Cigarettes   • Smokeless tobacco: Never Used   • Tobacco comment: vape   Substance and Sexual Activity   • Alcohol use: Yes     Comment: occasional   • Drug use: No     Types: Methamphetamines     Comment: lasted used 2 yrs ago, IV user. every day.    • Sexual activity: Yes     " Partners: Male   Lifestyle   • Physical activity     Days per week: Not on file     Minutes per session: Not on file   • Stress: Not on file   Relationships   • Social connections     Talks on phone: Not on file     Gets together: Not on file     Attends Oriental orthodox service: Not on file     Active member of club or organization: Not on file     Attends meetings of clubs or organizations: Not on file     Relationship status: Not on file   • Intimate partner violence     Fear of current or ex partner: Not on file     Emotionally abused: Not on file     Physically abused: Not on file     Forced sexual activity: Not on file   Other Topics Concern   • Not on file   Social History Narrative   • Not on file     Family History:  Family History   Problem Relation Age of Onset   • Psychiatric Illness Mother    • Thyroid Mother    • Hypertension Mother    • Diabetes Mother    • Hypertension Maternal Grandfather       Objective:   Vitals:  /78   Ht 1.702 m (5' 7\")   Wt 96 kg (211 lb 11.2 oz)   Body mass index is 33.16 kg/m². (Goal BM I>18 <25)    Physical Exam:   Nursing note and vitals reviewed.  GENERAL: No acute distress  HENT: Atraumatic, normocephalic  EYES: Extraocular movements intact, pupils equal and reactive to light  NECK: Supple, Full ROM  CHEST: No deformities, Equal chest expansion  RESP: Unlabored, no stridor or audible wheeze  ABD: Soft, Nontender, Non-Distended  Extremities: No Clubbing, Cyanosis, or Edema  Skin: Warm/dry, without rases  Neuro: A/O x 4, CN 2-12 Grossly intact, Motor/sensory grossly intact  Psych: Normal mood, behavior, and affect    Genitourinary:  Normal appearing external female genitalia without any rashes, lesions, labial fusion or tenderness.  Vagina is pink moist and well rugated. bloody discharge present within vaginal vault.  Cervix well visualized without masses or lesions.  Gc/Ct obtained. On bimanual exam there is no cervical motion tenderness, uterus is anteverted, slightly " enlarged, fixed, or tender.  No adnexal masses or tenderness.      Assessment/Plan:   Jg Thompson is a 35 y.o.  female who presents for:    1. Encounter for surveillance of other contraceptive  POCT Pregnancy    Consent for all Surgical, Special Diagnostic or Therapeutic Procedures   2. Dysuria  POCT Urinalysis   3. Stress incontinence of urine  REFERRAL TO PHYSICAL THERAPY Reason for Therapy: Eval/Treat/Report   4. Screen for sexually transmitted diseases     5. Class 1 obesity with body mass index (BMI) of 32.0 to 32.9 in adult, unspecified obesity type, unspecified whether serious comorbidity present     6. Encounter for removal and reinsertion of intrauterine contraceptive device (IUD)       #Contraceptive management.  IUD removal and replacement is performed today.  See procedure note.  #Stress urinary incontinence of urine during coitus.  Discussed treatment modalities with patient today.  She reports that she does not know how to do Kegel exercises and interested in further direction about this.  Referral to pelvic floor physical therapy has been placed.  If this is not effective we will further address other treatment modalities following this treatment  #Screen for STDs.  All screening offered patient would only like vaginal swabs obtained.  GCC T obtained and will alert patient of results.  Discussed importance of using condoms when not in a monogamous relationship as this is the only modality that will prevent STDs.  #Dysuria.  Negative UA.  Encouraged dietary and behavioral modifications  #Follow up.  RTC for string check appointment or sooner if concerns or questions arise.    Patient was seen for 25 minutes of which > 50% of appointment time was spent on face-to-face counseling and coordination of care regarding the above.

## 2020-10-15 NOTE — PROCEDURES
IUD Removal    Date/Time: 10/15/2020 4:19 PM  Performed by: Pearl Domingo D.O.  Authorized by: Pearl Domingo D.O.     Consent:     Consent obtained:  Written    Procedure risks and benefits discussed: yes      Patient questions answered: yes      Patient agrees, verbalizes understanding, and wants to proceed: yes      Educational handouts given: yes      Instructions and paperwork completed: yes    Pre-procedure details:     Reason for removal:  IUD      Length of time IUD in place:  5 years  Procedure:     Pelvic exam performed: yes      Speculum placed: yes      IUD strings visualized in external os: no      Removal mechanism:  IUD hook    IUD removed intact: yes      IUD type removed:  Mirena    Removal complications: no    Post-procedure:     New birth control prescribed: yes      Counseling regarding contraception given: yes    IUD Insertion    Date/Time: 10/15/2020 4:21 PM  Performed by: Pearl Domingo D.O.  Authorized by: Pearl Domingo D.O.     Consent:     Consent obtained:  Written    Consent given by:  Patient    Procedure risks and benefits discussed: yes      Patient questions answered: yes      Patient agrees, verbalizes understanding, and wants to proceed: yes      Educational handouts given: yes      Instructions and paperwork completed: yes    Pre-procedure details:     Negative GC/chlamydia test: no      Negative urine pregnancy test: yes      Negative serum pregnancy test: no    Procedure:     Pelvic exam performed: yes      Sterile speculum placed in vagina: yes      Cervix visualized: yes      Cervix cleaned and prepped in sterile fashion: yes      Tenaculum applied to cervix: no      Dilation needed: no      Uterus sounded: yes      Uterus sound depth (cm):  8    IUD inserted with no complications: yes      IUD type:  Mirena    Strings trimmed: yes    Post-procedure:     Patient tolerated procedure well: yes      Patient will follow up after next  period: yes    Comments:      Lot #[id number] Exp No 2022

## 2020-11-19 ENCOUNTER — GYNECOLOGY VISIT (OUTPATIENT)
Dept: OBGYN | Facility: CLINIC | Age: 35
End: 2020-11-19
Payer: COMMERCIAL

## 2020-11-19 ENCOUNTER — HOSPITAL ENCOUNTER (OUTPATIENT)
Facility: MEDICAL CENTER | Age: 35
End: 2020-11-19
Attending: FAMILY MEDICINE
Payer: COMMERCIAL

## 2020-11-19 ENCOUNTER — HOSPITAL ENCOUNTER (OUTPATIENT)
Facility: MEDICAL CENTER | Age: 35
End: 2020-11-19
Attending: NURSE PRACTITIONER
Payer: COMMERCIAL

## 2020-11-19 VITALS — BODY MASS INDEX: 33.36 KG/M2 | WEIGHT: 213 LBS | DIASTOLIC BLOOD PRESSURE: 77 MMHG | SYSTOLIC BLOOD PRESSURE: 125 MMHG

## 2020-11-19 DIAGNOSIS — Z30.49 ENCOUNTER FOR SURVEILLANCE OF OTHER CONTRACEPTIVE: Primary | ICD-10-CM

## 2020-11-19 PROCEDURE — 99395 PREV VISIT EST AGE 18-39: CPT | Performed by: NURSE PRACTITIONER

## 2020-11-19 PROCEDURE — 87591 N.GONORRHOEAE DNA AMP PROB: CPT

## 2020-11-19 PROCEDURE — 88175 CYTOPATH C/V AUTO FLUID REDO: CPT

## 2020-11-19 PROCEDURE — 87510 GARDNER VAG DNA DIR PROBE: CPT

## 2020-11-19 PROCEDURE — 87480 CANDIDA DNA DIR PROBE: CPT

## 2020-11-19 PROCEDURE — 87660 TRICHOMONAS VAGIN DIR PROBE: CPT

## 2020-11-19 PROCEDURE — 87491 CHLMYD TRACH DNA AMP PROBE: CPT

## 2020-11-19 PROCEDURE — 87624 HPV HI-RISK TYP POOLED RSLT: CPT

## 2020-11-19 ASSESSMENT — FIBROSIS 4 INDEX: FIB4 SCORE: 0.29

## 2020-11-19 NOTE — NON-PROVIDER
Pt here for gyn visit string check and retest GC/CT  Pt states has an odor and itching  Good#[phone]  Pharmacy verified

## 2020-11-19 NOTE — PROGRESS NOTES
"S:  Pt had Mirena IUD placed without any complications and presents for an IUD check up. She reports irregular spotting,  denies pain, denies discomfort with intercourse, having some \"different smelling\" discharge. Denies itching, burning or foul smell.  Denies fever, chills, nausea, vomiting. Overall very happy with device.    O:  FEMALE GYN: normal female external genitalia without lesions, bloody vaginal discharge noted, vulva pink without erythema or friability, urethra is normal without discharge or scarring, no bladder fullness or masses, normal vagina and normal vaginal tone, normal cervix, normal  uterus, size and consistency, 2 black IUD strings seen at cervical os, 3 cm in length, normal anus and perineum.    A/P:  -Collected pap as well (due in March 2021) as well as recollection of GC/CT (done last month but incorrect swab used) and vaginal pathogens  -Discussed coordination of care of IUD and normal menstrual irregularity side effects. -Reminded patient to check for strings monthly and to call the office if IUD has fallen out or any other problems should arise.  -Reminded patient IUD expires in 5 years.  -Reminded of annual gyn visit in one year.      "

## 2020-11-20 DIAGNOSIS — Z30.49 ENCOUNTER FOR SURVEILLANCE OF OTHER CONTRACEPTIVE: ICD-10-CM

## 2020-11-20 LAB
C TRACH DNA SPEC QL NAA+PROBE: NEGATIVE
CANDIDA DNA VAG QL PROBE+SIG AMP: NEGATIVE
G VAGINALIS DNA VAG QL PROBE+SIG AMP: POSITIVE
N GONORRHOEA DNA SPEC QL NAA+PROBE: NEGATIVE
SPECIMEN SOURCE: NORMAL
T VAGINALIS DNA VAG QL PROBE+SIG AMP: NEGATIVE

## 2020-11-21 DIAGNOSIS — Z30.49 ENCOUNTER FOR SURVEILLANCE OF OTHER CONTRACEPTIVE: ICD-10-CM

## 2020-11-23 RX ORDER — METRONIDAZOLE 500 MG/1
500 TABLET ORAL 2 TIMES DAILY
Qty: 14 TAB | Refills: 0 | Status: SHIPPED | OUTPATIENT
Start: 2020-11-23 | End: 2021-01-01

## 2021-01-01 ENCOUNTER — OFFICE VISIT (OUTPATIENT)
Dept: URGENT CARE | Facility: PHYSICIAN GROUP | Age: 36
End: 2021-01-01
Payer: COMMERCIAL

## 2021-01-01 ENCOUNTER — HOSPITAL ENCOUNTER (OUTPATIENT)
Facility: MEDICAL CENTER | Age: 36
End: 2021-01-01
Attending: PHYSICIAN ASSISTANT
Payer: COMMERCIAL

## 2021-01-01 VITALS
HEART RATE: 94 BPM | WEIGHT: 209 LBS | BODY MASS INDEX: 31.67 KG/M2 | HEIGHT: 68 IN | DIASTOLIC BLOOD PRESSURE: 84 MMHG | SYSTOLIC BLOOD PRESSURE: 136 MMHG | RESPIRATION RATE: 16 BRPM | OXYGEN SATURATION: 100 % | TEMPERATURE: 98.7 F

## 2021-01-01 DIAGNOSIS — Z20.2 STD EXPOSURE: Primary | ICD-10-CM

## 2021-01-01 DIAGNOSIS — R30.0 DYSURIA: ICD-10-CM

## 2021-01-01 DIAGNOSIS — R31.9 HEMATURIA, UNSPECIFIED TYPE: ICD-10-CM

## 2021-01-01 DIAGNOSIS — N76.0 ACUTE VAGINITIS: ICD-10-CM

## 2021-01-01 LAB
APPEARANCE UR: NORMAL
BILIRUB UR STRIP-MCNC: NEGATIVE MG/DL
COLOR UR AUTO: YELLOW
GLUCOSE UR STRIP.AUTO-MCNC: NEGATIVE MG/DL
KETONES UR STRIP.AUTO-MCNC: NEGATIVE MG/DL
LEUKOCYTE ESTERASE UR QL STRIP.AUTO: NORMAL
NITRITE UR QL STRIP.AUTO: NEGATIVE
PH UR STRIP.AUTO: 5.5 [PH] (ref 5–8)
PROT UR QL STRIP: NEGATIVE MG/DL
RBC UR QL AUTO: NORMAL
SP GR UR STRIP.AUTO: 1.01
UROBILINOGEN UR STRIP-MCNC: 0.2 MG/DL

## 2021-01-01 PROCEDURE — 87086 URINE CULTURE/COLONY COUNT: CPT

## 2021-01-01 PROCEDURE — 81002 URINALYSIS NONAUTO W/O SCOPE: CPT | Performed by: PHYSICIAN ASSISTANT

## 2021-01-01 PROCEDURE — 87480 CANDIDA DNA DIR PROBE: CPT

## 2021-01-01 PROCEDURE — 99214 OFFICE O/P EST MOD 30 MIN: CPT | Mod: 25 | Performed by: PHYSICIAN ASSISTANT

## 2021-01-01 PROCEDURE — 87510 GARDNER VAG DNA DIR PROBE: CPT

## 2021-01-01 PROCEDURE — 87660 TRICHOMONAS VAGIN DIR PROBE: CPT

## 2021-01-01 RX ORDER — AZITHROMYCIN 500 MG/1
1000 TABLET, FILM COATED ORAL ONCE
Qty: 2 TAB | Refills: 0 | Status: SHIPPED | OUTPATIENT
Start: 2021-01-01 | End: 2021-01-01

## 2021-01-01 ASSESSMENT — ENCOUNTER SYMPTOMS
COUGH: 0
SHORTNESS OF BREATH: 0
NAUSEA: 0
DIARRHEA: 0
FLANK PAIN: 0
VOMITING: 0
CONSTIPATION: 0
CHILLS: 0
VAGINITIS: 1
FEVER: 0
ABDOMINAL PAIN: 0

## 2021-01-01 ASSESSMENT — PAIN SCALES - GENERAL: PAINLEVEL: NO PAIN

## 2021-01-01 ASSESSMENT — FIBROSIS 4 INDEX: FIB4 SCORE: 0.29

## 2021-01-02 DIAGNOSIS — R31.9 HEMATURIA, UNSPECIFIED TYPE: ICD-10-CM

## 2021-01-02 DIAGNOSIS — N76.0 ACUTE VAGINITIS: ICD-10-CM

## 2021-01-02 DIAGNOSIS — R30.0 DYSURIA: ICD-10-CM

## 2021-01-02 LAB
CANDIDA DNA VAG QL PROBE+SIG AMP: NEGATIVE
G VAGINALIS DNA VAG QL PROBE+SIG AMP: POSITIVE
T VAGINALIS DNA VAG QL PROBE+SIG AMP: NEGATIVE

## 2021-01-02 NOTE — PROGRESS NOTES
Subjective:   Jg Thompson is a 35 y.o. female who presents for Sexually Transmitted Diseases (sx started a week ago, pt states she has an odor and a watery discharges. )        Vaginitis  The patient's primary symptoms include vaginal discharge. This is a new problem. Episode onset: 1 week  The problem occurs constantly. The problem has been unchanged. Associated symptoms include dysuria. Pertinent negatives include no abdominal pain, chills, constipation, diarrhea, fever, flank pain, frequency, hematuria, nausea, painful intercourse, rash, urgency or vomiting. The vaginal discharge was malodorous and copious. She has tried nothing for the symptoms. She is sexually active. Yes, her partner has an STD. She uses an IUD for contraception. Her past medical history is significant for an STD and a terminated pregnancy.     Pt recently informed by ex-boyfriend that he is positive for a STD. She is not sure which. Previously had gonorrhea and chlamydia from same sexual partner.     Review of Systems   Constitutional: Negative for chills, fever and malaise/fatigue.   Respiratory: Negative for cough and shortness of breath.    Gastrointestinal: Negative for abdominal pain, constipation, diarrhea, nausea and vomiting.   Genitourinary: Positive for dysuria and vaginal discharge. Negative for flank pain, frequency, hematuria and urgency.        Vaginal discharge   Skin: Negative for rash.   All other systems reviewed and are negative.      PMH:  has a past medical history of Anxiety, Depression, Herpes, Lumbosacral radiculitis (1/30/2018), Other fatigue (7/30/2018), and Substance abuse ([facility]). She also has no past medical history of Addisons disease ([facility]), Adrenal disorder ([facility]), Allergy, Anemia, Arrhythmia, Arthritis, Asthma, Blood transfusion, Cancer ([facility]), Cataract, CHF (congestive heart failure) ([facility]), Clotting disorder ([facility]), Cushings syndrome ([facility]), Diabetic neuropathy ([facility]), EMPHYSEMA, GERD (gastroesophageal  "reflux disease), Glaucoma, Goiter, Head ache, Headache(784.0), Heart attack (HCC), HIV (human immunodeficiency virus infection), Hyperlipidemia, Hypertension, IBD (inflammatory bowel disease), Kidney disease, Meningitis, Migraine, OSTEOPOROSIS, Parathyroid disorder (HCC), Pituitary disease (HCC), Seizure (HCC), Sickle cell disease (HCC), Stroke (HCC), Thyroid disease, Tuberculosis, Ulcer, or Urinary tract infection, site not specified.  MEDS:   Current Outpatient Medications:   •  azithromycin (ZITHROMAX) 500 MG tablet, Take 2 Tabs by mouth one time for 1 dose., Disp: 2 Tab, Rfl: 0  ALLERGIES:   Allergies   Allergen Reactions   • Penicillins      SURGHX:   Past Surgical History:   Procedure Laterality Date   • TONSILLECTOMY       SOCHX:  reports that she has quit smoking. Her smoking use included cigarettes. She has never used smokeless tobacco. She reports current alcohol use. She reports that she does not use drugs.  Family History   Problem Relation Age of Onset   • Psychiatric Illness Mother    • Thyroid Mother    • Hypertension Mother    • Diabetes Mother    • Hypertension Maternal Grandfather         Objective:   /84 (BP Location: Right arm, Patient Position: Sitting, BP Cuff Size: Adult)   Pulse 94   Temp 37.1 °C (98.7 °F) (Temporal)   Resp 16   Ht 1.727 m (5' 8\")   Wt 94.8 kg (209 lb)   LMP 12/01/2020 (Approximate)   SpO2 100%   BMI 31.78 kg/m²     Physical Exam  Vitals signs reviewed.   Constitutional:       General: She is not in acute distress.     Appearance: Normal appearance. She is well-developed. She is not ill-appearing.   HENT:      Head: Normocephalic and atraumatic.      Right Ear: External ear normal.      Left Ear: External ear normal.      Nose: Nose normal.      Mouth/Throat:      Mouth: Mucous membranes are moist.   Eyes:      Conjunctiva/sclera: Conjunctivae normal.      Pupils: Pupils are equal, round, and reactive to light.   Neck:      Musculoskeletal: Normal range of " motion and neck supple.      Trachea: No tracheal deviation.   Cardiovascular:      Rate and Rhythm: Normal rate and regular rhythm.   Pulmonary:      Effort: Pulmonary effort is normal.      Breath sounds: Normal breath sounds.   Genitourinary:     Comments: Deferred per pt request   Skin:     General: Skin is warm and dry.      Capillary Refill: Capillary refill takes less than 2 seconds.   Neurological:      General: No focal deficit present.      Mental Status: She is alert and oriented to person, place, and time.   Psychiatric:         Mood and Affect: Mood normal.         Behavior: Behavior normal.           Assessment/Plan:     1. STD exposure  Chlamydia/GC PCR Urine Or Swab    azithromycin (ZITHROMAX) 500 MG tablet    cefTRIAXone (ROCEPHIN) 500 mg, lidocaine (XYLOCAINE) 1 % 1.8 mL for IM use   2. Acute vaginitis  VAGINAL PATHOGENS DNA PANEL    POCT Urinalysis   3. Hematuria, unspecified type  URINE CULTURE(NEW)   4. Dysuria  URINE CULTURE(NEW)     UA: hematuria, leuks    Supportive care reviewed.  She will be treated empirically with Rocephin and azithromycin.  She will refrain from intercourse until gonorrhea and chlamydia results are finalized.    If symptoms worsen or persist patient can return to clinic for reevaluation. All side effects of medication discussed including allergic response, GI upset, tendon injury, etc. Patient confirmed understanding of information.    Please note that this dictation was created using voice recognition software. I have made every reasonable attempt to correct obvious errors, but I expect that there are errors of grammar and possibly content that I did not discover before finalizing the note.

## 2021-01-03 DIAGNOSIS — N76.0 ACUTE VAGINITIS: Primary | ICD-10-CM

## 2021-01-04 DIAGNOSIS — B96.89 BACTERIAL VAGINOSIS: Primary | ICD-10-CM

## 2021-01-04 DIAGNOSIS — N76.0 BACTERIAL VAGINOSIS: Primary | ICD-10-CM

## 2021-01-04 LAB
BACTERIA UR CULT: NORMAL
SIGNIFICANT IND 70042: NORMAL
SITE SITE: NORMAL
SOURCE SOURCE: NORMAL

## 2021-01-04 RX ORDER — METRONIDAZOLE 500 MG/1
500 TABLET ORAL 2 TIMES DAILY
Qty: 14 TAB | Refills: 0 | Status: SHIPPED | OUTPATIENT
Start: 2021-01-04 | End: 2021-01-11

## 2021-01-04 NOTE — PROGRESS NOTES
Spoke with patient reviewed lab results.  She was treated empirically for gonorrhea and chlamydia at visit.  She will start metronidazole twice a day for 1 week, AEs of medication and alcohol avoidance discussed.  Return to clinic precautions discussed.  She can call me with any further questions.

## 2021-01-10 ENCOUNTER — OFFICE VISIT (OUTPATIENT)
Dept: URGENT CARE | Facility: PHYSICIAN GROUP | Age: 36
End: 2021-01-10
Payer: COMMERCIAL

## 2021-01-10 VITALS
TEMPERATURE: 97.7 F | RESPIRATION RATE: 16 BRPM | OXYGEN SATURATION: 100 % | DIASTOLIC BLOOD PRESSURE: 84 MMHG | HEIGHT: 68 IN | SYSTOLIC BLOOD PRESSURE: 126 MMHG | HEART RATE: 96 BPM | BODY MASS INDEX: 31.67 KG/M2 | WEIGHT: 209 LBS

## 2021-01-10 DIAGNOSIS — R11.2 NON-INTRACTABLE VOMITING WITH NAUSEA, UNSPECIFIED VOMITING TYPE: ICD-10-CM

## 2021-01-10 DIAGNOSIS — K27.9 PUD (PEPTIC ULCER DISEASE): ICD-10-CM

## 2021-01-10 DIAGNOSIS — R10.13 EPIGASTRIC ABDOMINAL PAIN: ICD-10-CM

## 2021-01-10 LAB
APPEARANCE UR: NORMAL
BILIRUB UR STRIP-MCNC: NEGATIVE MG/DL
COLOR UR AUTO: YELLOW
GLUCOSE UR STRIP.AUTO-MCNC: NEGATIVE MG/DL
INT CON NEG: NORMAL
INT CON POS: NORMAL
KETONES UR STRIP.AUTO-MCNC: NEGATIVE MG/DL
LEUKOCYTE ESTERASE UR QL STRIP.AUTO: NEGATIVE
NITRITE UR QL STRIP.AUTO: NEGATIVE
PH UR STRIP.AUTO: 5.5 [PH] (ref 5–8)
POC URINE PREGNANCY TEST: NEGATIVE
PROT UR QL STRIP: NEGATIVE MG/DL
RBC UR QL AUTO: NEGATIVE
SP GR UR STRIP.AUTO: 1.01
UROBILINOGEN UR STRIP-MCNC: 0.2 MG/DL

## 2021-01-10 PROCEDURE — 81025 URINE PREGNANCY TEST: CPT | Performed by: FAMILY MEDICINE

## 2021-01-10 PROCEDURE — 99214 OFFICE O/P EST MOD 30 MIN: CPT | Performed by: FAMILY MEDICINE

## 2021-01-10 PROCEDURE — 81002 URINALYSIS NONAUTO W/O SCOPE: CPT | Performed by: FAMILY MEDICINE

## 2021-01-10 RX ORDER — ONDANSETRON 4 MG/1
4 TABLET, ORALLY DISINTEGRATING ORAL EVERY 8 HOURS PRN
Qty: 6 TAB | Refills: 0 | Status: SHIPPED | OUTPATIENT
Start: 2021-01-10 | End: 2021-01-31 | Stop reason: SDUPTHER

## 2021-01-10 RX ORDER — ONDANSETRON 4 MG/1
4 TABLET, ORALLY DISINTEGRATING ORAL ONCE
Status: COMPLETED | OUTPATIENT
Start: 2021-01-10 | End: 2021-01-10

## 2021-01-10 RX ORDER — SUCRALFATE 1 G/1
1 TABLET ORAL
Qty: 56 TAB | Refills: 0 | Status: SHIPPED | OUTPATIENT
Start: 2021-01-10 | End: 2021-01-31 | Stop reason: SDUPTHER

## 2021-01-10 RX ADMIN — ONDANSETRON 4 MG: 4 TABLET, ORALLY DISINTEGRATING ORAL at 17:05

## 2021-01-10 ASSESSMENT — ENCOUNTER SYMPTOMS
EYE REDNESS: 0
NAUSEA: 0
VOMITING: 0
EYE DISCHARGE: 0
WEIGHT LOSS: 0
MYALGIAS: 0

## 2021-01-10 ASSESSMENT — FIBROSIS 4 INDEX: FIB4 SCORE: 0.29

## 2021-01-10 NOTE — LETTER
January 10, 2021         Patient: Jg Thompson   YOB: 1985   Date of Visit: 1/10/2021           To Whom it May Concern:    Jg Thompson was seen in my clinic on 1/10/2021. Please excuse 1/10 through 1/12/2020.    Sincerely,           Tobias Faria M.D.  Electronically Signed

## 2021-01-11 NOTE — PROGRESS NOTES
"Subjective:      Jg Margareth Thompson is a 35 y.o. female who presents with Nausea (abdominal cramps, burning pain, vomiting,  started this morning)            Onset this morning N/V. Emesis x 1 without blood. Burning abdominal pain is worse with eating and not localizing.  PMH PUD.  She notes that she has responded well to Carafate in the past.  No melena.  No radiation. No diarrhea. Does not suspect pregnancy. No fever. No clear food trigger. No OTC medications. No other aggravating or alleviating factors.        Review of Systems   Constitutional: Negative for malaise/fatigue and weight loss.   Eyes: Negative for discharge and redness.   Gastrointestinal: Negative for nausea and vomiting.   Musculoskeletal: Negative for joint pain and myalgias.   Skin: Negative for itching and rash.     .  Medications, Allergies, and current problem list reviewed today in Epic       Objective:     /84 (BP Location: Right arm, Patient Position: Sitting, BP Cuff Size: Adult)   Pulse 96   Temp 36.5 °C (97.7 °F) (Temporal)   Resp 16   Ht 1.727 m (5' 8\")   Wt 94.8 kg (209 lb)   SpO2 100%   BMI 31.78 kg/m²      Physical Exam  Constitutional:       General: She is not in acute distress.     Appearance: She is well-developed.   HENT:      Head: Normocephalic and atraumatic.   Eyes:      Conjunctiva/sclera: Conjunctivae normal.   Cardiovascular:      Rate and Rhythm: Normal rate and regular rhythm.      Heart sounds: Normal heart sounds. No murmur.   Pulmonary:      Effort: Pulmonary effort is normal.      Breath sounds: Normal breath sounds. No wheezing.   Abdominal:      Palpations: Abdomen is soft.      Tenderness: There is abdominal tenderness (diffuse, greatest epigastrum). There is no guarding.      Comments: Hyperactive bowel sounds.     Skin:     General: Skin is warm and dry.      Findings: No rash.   Neurological:      Mental Status: She is alert and oriented to person, place, and time.               "   Assessment/Plan:       UA reviewed  Pregnancy negative    1. Non-intractable vomiting with nausea, unspecified vomiting type  ondansetron (ZOFRAN ODT) 4 MG TABLET DISPERSIBLE    ondansetron (ZOFRAN ODT) dispertab 4 mg   2. PUD (peptic ulcer disease)  sucralfate (CARAFATE) 1 GM Tab   3. Epigastric abdominal pain  sucralfate (CARAFATE) 1 GM Tab    CBC WITH DIFFERENTIAL    Comp Metabolic Panel    LIPASE    CANCELED: LIPASE     Differential diagnosis, natural history, supportive care, and indications for immediate follow-up discussed at length.     We will follow-up laboratory studies.  She may use OTC PPI.

## 2021-01-31 ENCOUNTER — OFFICE VISIT (OUTPATIENT)
Dept: URGENT CARE | Facility: PHYSICIAN GROUP | Age: 36
End: 2021-01-31
Payer: COMMERCIAL

## 2021-01-31 ENCOUNTER — HOSPITAL ENCOUNTER (OUTPATIENT)
Facility: MEDICAL CENTER | Age: 36
End: 2021-01-31
Attending: FAMILY MEDICINE
Payer: COMMERCIAL

## 2021-01-31 VITALS
SYSTOLIC BLOOD PRESSURE: 144 MMHG | HEIGHT: 67 IN | BODY MASS INDEX: 30.61 KG/M2 | RESPIRATION RATE: 12 BRPM | DIASTOLIC BLOOD PRESSURE: 92 MMHG | WEIGHT: 195 LBS | HEART RATE: 98 BPM | OXYGEN SATURATION: 100 % | TEMPERATURE: 98.5 F

## 2021-01-31 DIAGNOSIS — F41.9 ANXIETY: ICD-10-CM

## 2021-01-31 DIAGNOSIS — K21.9 GASTROESOPHAGEAL REFLUX DISEASE WITHOUT ESOPHAGITIS: ICD-10-CM

## 2021-01-31 DIAGNOSIS — K27.9 PUD (PEPTIC ULCER DISEASE): ICD-10-CM

## 2021-01-31 DIAGNOSIS — R11.2 NON-INTRACTABLE VOMITING WITH NAUSEA, UNSPECIFIED VOMITING TYPE: ICD-10-CM

## 2021-01-31 LAB
APPEARANCE UR: CLEAR
BILIRUB UR STRIP-MCNC: NORMAL MG/DL
COLOR UR AUTO: YELLOW
GLUCOSE UR STRIP.AUTO-MCNC: NORMAL MG/DL
KETONES UR STRIP.AUTO-MCNC: NORMAL MG/DL
LEUKOCYTE ESTERASE UR QL STRIP.AUTO: NORMAL
NITRITE UR QL STRIP.AUTO: NORMAL
PH UR STRIP.AUTO: 7 [PH] (ref 5–8)
PROT UR QL STRIP: NORMAL MG/DL
RBC UR QL AUTO: NORMAL
SP GR UR STRIP.AUTO: 1.02
UROBILINOGEN UR STRIP-MCNC: 0.2 MG/DL

## 2021-01-31 PROCEDURE — 99215 OFFICE O/P EST HI 40 MIN: CPT | Performed by: FAMILY MEDICINE

## 2021-01-31 PROCEDURE — 81002 URINALYSIS NONAUTO W/O SCOPE: CPT | Performed by: FAMILY MEDICINE

## 2021-01-31 PROCEDURE — 87086 URINE CULTURE/COLONY COUNT: CPT

## 2021-01-31 RX ORDER — SUCRALFATE 1 G/1
1 TABLET ORAL
Qty: 56 TAB | Refills: 0 | Status: SHIPPED | OUTPATIENT
Start: 2021-01-31 | End: 2021-02-14

## 2021-01-31 RX ORDER — ONDANSETRON 4 MG/1
4 TABLET, ORALLY DISINTEGRATING ORAL EVERY 8 HOURS PRN
Qty: 6 TAB | Refills: 0 | Status: SHIPPED | OUTPATIENT
Start: 2021-01-31 | End: 2023-07-11

## 2021-01-31 RX ORDER — OMEPRAZOLE 20 MG/1
20 CAPSULE, DELAYED RELEASE ORAL DAILY
Qty: 30 CAP | Refills: 0 | Status: SHIPPED | OUTPATIENT
Start: 2021-01-31 | End: 2023-07-11

## 2021-01-31 RX ORDER — ALPRAZOLAM 0.5 MG/1
0.5 TABLET ORAL NIGHTLY PRN
Qty: 7 TAB | Refills: 0 | Status: SHIPPED | OUTPATIENT
Start: 2021-01-31 | End: 2021-02-07

## 2021-01-31 ASSESSMENT — FIBROSIS 4 INDEX: FIB4 SCORE: 0.29

## 2021-01-31 NOTE — LETTER
January 31, 2021         Patient: Jg Thompson   YOB: 1985   Date of Visit: 1/31/2021           To Whom it May Concern:    Jg Thompson was seen in my clinic on 1/31/2021. Please excuse her absence from work 1/31-2/1.    If you have any questions or concerns, please don't hesitate to call.        Sincerely,           True Del Rio M.D.  Electronically Signed

## 2021-01-31 NOTE — LETTER
January 31, 2021         Patient: Jg Thompson   YOB: 1985   Date of Visit: 1/31/2021           To Whom it May Concern:    Jg Thompson was seen in my clinic on 1/31/2021. Please excuse her work absence 1/31-2/1.    If you have any questions or concerns, please don't hesitate to call.        Sincerely,           True Del Rio M.D.  Electronically Signed

## 2021-01-31 NOTE — LETTER
January 31, 2021       Patient: Jg Thompson   YOB: 1985   Date of Visit: 1/31/2021         To Whom It May Concern:    In my medical opinion, I recommend that Jg Thompson {Work release (duty restriction):80196}    If you have any questions or concerns, please don't hesitate to call [phone]          Sincerely,          True Del Rio M.D.  Electronically Signed

## 2021-02-01 DIAGNOSIS — K27.9 PUD (PEPTIC ULCER DISEASE): ICD-10-CM

## 2021-02-01 NOTE — PROGRESS NOTES
Subjective:   CC:  ABD PAIN            Abdominal Pain  This is a new problem. The current episode started 3 wks ago. The onset quality is gradual.   She is under a lot of personal stress.  She feels this is worsening her abd pain.      +n/v and had coffee-ground emesis today.           The problem is worse. The pain is located in the epigastric region. The pain is moderate. The quality of the pain is described as burning. The pain does not radiate . Pertinent negatives include no   constipation,  , dysuria, fever, hematochezia, hematuria,  or sore throat. Nothing relieves the symptoms. Past treatments include antacids - some relief.           Social History     Tobacco Use   • Smoking status: Former Smoker     Types: Cigarettes   • Smokeless tobacco: Never Used   • Tobacco comment: vape   Substance Use Topics   • Alcohol use: Yes     Comment: occasional   • Drug use: No     Types: Methamphetamines     Comment: lasted used 2 yrs ago, IV user. every day.        No current outpatient medications on file prior to visit.     No current facility-administered medications on file prior to visit.        Past Medical History:   Diagnosis Date   • Anxiety    • Depression    • Herpes     2006   • Lumbosacral radiculitis 1/30/2018   • Other fatigue 7/30/2018   • Substance abuse (HCC)           Review of Systems   Constitutional: Negative for fever, chills and malaise/fatigue.   Eyes: Negative for vision changes, d/c.    Respiratory: Negative for cough and sputum production.    Cardiovascular: Negative for chest pain and palpitations.   Gastrointestinal: + abdominal pain.   Negative for  diarrhea and constipation.   Genitourinary: Negative for dysuria, urgency and frequency.   Skin: Negative for rash or  itching.   Neurological: Negative for dizziness and tingling.      Psych:  + anxiety.  Denies depression, SI.   Hematologic/lymphatic - denies bruising or excessive bleeding  All other systems reviewed and are negative.        "Objective:     /92 (BP Location: Right arm, Patient Position: Sitting, BP Cuff Size: Adult)   Pulse 98   Temp 36.9 °C (98.5 °F) (Temporal)   Resp 12   Ht 1.702 m (5' 7\")   Wt 88.5 kg (195 lb)   SpO2 100%         Physical Exam   Constitutional: pt appears well-developed. No distress.   HENT:   Nose: No nasal discharge.   Mouth/Throat: Mucous membranes are moist. Oropharynx is clear.   Eyes: Conjunctivae and EOM are normal. Pupils are equal, round, and reactive to light. Right eye exhibits no discharge. Left eye exhibits no discharge.   Neck: Neck supple.   Cardiovascular: Normal rate, regular rhythm, S1 normal and S2 normal.    Pulmonary/Chest: Effort normal and breath sounds normal. There is normal air entry. No respiratory distress.   Abdominal: Soft. There is tenderness in the epigastric area.  no HSM.   BS normal.   Lymphadenopathy:     She has no cervical adenopathy.   Neurological: He is alert. No cranial nerve deficit.   Skin: Skin is warm and moist. No petechiae and no rash noted.   not diaphoretic. No jaundice.   Psych:  + tearful affect  Nursing note and vitals reviewed.            Assessment/Plan:          1. PUD (peptic ulcer disease)  Sx suspicious for upper GI bleed  Pt refused transfer to ED.   Risks of doing so explained to pt and she understands  - sucralfate (CARAFATE) 1 GM Tab; Take 1 Tab by mouth 4 Times a Day,Before Meals and at Bedtime for 14 days.  Dispense: 56 Tab; Refill: 0  - omeprazole (PRILOSEC) 20 MG delayed-release capsule; Take 1 Cap by mouth every day.  Dispense: 30 Cap; Refill: 0      UA positive for LE - will send for cx and tx appropriately      - URINE CULTURE(NEW); Future    2. Non-intractable vomiting with nausea, unspecified vomiting type     - ondansetron (ZOFRAN ODT) 4 MG TABLET DISPERSIBLE; Take 1 Tab by mouth every 8 hours as needed.  Dispense: 6 Tab; Refill: 0          3. Anxiety       - ALPRAZolam (XANAX) 0.5 MG Tab; Take 1 Tab by mouth at bedtime as needed for " Sleep for up to 7 days.  Dispense: 7 Tab; Refill: 0        A total of at least 40 minutes were spent with the patient during this encounter taking history, physical, placing orders, chart review and imaging interpretation.

## 2021-02-03 LAB
BACTERIA UR CULT: NORMAL
SIGNIFICANT IND 70042: NORMAL
SITE SITE: NORMAL
SOURCE SOURCE: NORMAL

## 2021-08-13 ENCOUNTER — HOSPITAL ENCOUNTER (EMERGENCY)
Facility: MEDICAL CENTER | Age: 36
End: 2021-08-14
Attending: EMERGENCY MEDICINE
Payer: MEDICAID

## 2021-08-13 DIAGNOSIS — A64 STI (SEXUALLY TRANSMITTED INFECTION): ICD-10-CM

## 2021-08-13 PROCEDURE — 81001 URINALYSIS AUTO W/SCOPE: CPT

## 2021-08-13 PROCEDURE — 99284 EMERGENCY DEPT VISIT MOD MDM: CPT | Mod: EDC

## 2021-08-13 PROCEDURE — 87086 URINE CULTURE/COLONY COUNT: CPT

## 2021-08-13 PROCEDURE — 81025 URINE PREGNANCY TEST: CPT

## 2021-08-13 PROCEDURE — 96372 THER/PROPH/DIAG INJ SC/IM: CPT | Mod: EDC

## 2021-08-13 NOTE — LETTER
8/17/2021               Jg Thompson  [address]  [facility] [address]        Dear Jg (MR#[id number])    As we have been unable to contact you by phone, this letter is sent in regards to your, recent visit to the [facility] Emergency Department on 8/13/2021. During the visit, tests were performed to assist the physician in your medical diagnosis. A review of your tests requires that we notify you of the following:    Your culture test was POSITIVE for Gonorrhea and Chlamydia, a sexually transmitted infection. This was treated appropriately in the Emergency Department and with the antibiotics prescribed for you (doxycycline) on discharge. It is important that you continue taking your antibiotic until it is finished..      Based on the above findings it is recommended that you seek testing for the presence of additional sexually transmitted infections, hepatitis, and HIV from the Health Department. Also, it is advised that you inform your sexual partner(s) within the previous 60 days of the above findings and direct them to the Health Department for testing, and to abstain from sexual intercourse seven days after the completion of antibiotic treatment. Should your symptoms progress, it is important that you follow up with your primary care physician, your local urgent care office, or return to the emergency department for further work up in order to prevent long term health issues.      Thank you for your cooperation in the matter.    Sincerely,  ED Culture Follow-Up Staff  Mariluz Donald, PharmD  PGY2 Infectious Diseases Pharmacy Resident      [facility]  [address] [place] [id number] [phone] (Mariluz's Phone Number)  [phone] (ED Culture Line)

## 2021-08-14 VITALS
TEMPERATURE: 96.7 F | SYSTOLIC BLOOD PRESSURE: 128 MMHG | HEIGHT: 67 IN | WEIGHT: 218.26 LBS | BODY MASS INDEX: 34.26 KG/M2 | HEART RATE: 85 BPM | DIASTOLIC BLOOD PRESSURE: 87 MMHG | OXYGEN SATURATION: 99 %

## 2021-08-14 LAB
ALBUMIN SERPL BCP-MCNC: 4.1 G/DL (ref 3.2–4.9)
ALBUMIN/GLOB SERPL: 1.5 G/DL
ALP SERPL-CCNC: 67 U/L (ref 30–99)
ALT SERPL-CCNC: 22 U/L (ref 2–50)
ANION GAP SERPL CALC-SCNC: 11 MMOL/L (ref 7–16)
APPEARANCE UR: ABNORMAL
AST SERPL-CCNC: 15 U/L (ref 12–45)
BACTERIA #/AREA URNS HPF: ABNORMAL /HPF
BASOPHILS # BLD AUTO: 1.9 % (ref 0–1.8)
BASOPHILS # BLD: 0.13 K/UL (ref 0–0.12)
BILIRUB SERPL-MCNC: 0.2 MG/DL (ref 0.1–1.5)
BILIRUB UR QL STRIP.AUTO: NEGATIVE
BUN SERPL-MCNC: 15 MG/DL (ref 8–22)
C TRACH DNA SPEC QL NAA+PROBE: POSITIVE
CALCIUM SERPL-MCNC: 8.8 MG/DL (ref 8.5–10.5)
CANDIDA DNA VAG QL PROBE+SIG AMP: NEGATIVE
CHLORIDE SERPL-SCNC: 101 MMOL/L (ref 96–112)
CO2 SERPL-SCNC: 23 MMOL/L (ref 20–33)
COLOR UR: YELLOW
CREAT SERPL-MCNC: 0.91 MG/DL (ref 0.5–1.4)
EOSINOPHIL # BLD AUTO: 0.31 K/UL (ref 0–0.51)
EOSINOPHIL NFR BLD: 4.6 % (ref 0–6.9)
EPI CELLS #/AREA URNS HPF: ABNORMAL /HPF
ERYTHROCYTE [DISTWIDTH] IN BLOOD BY AUTOMATED COUNT: 42.5 FL (ref 35.9–50)
G VAGINALIS DNA VAG QL PROBE+SIG AMP: NEGATIVE
GLOBULIN SER CALC-MCNC: 2.8 G/DL (ref 1.9–3.5)
GLUCOSE SERPL-MCNC: 78 MG/DL (ref 65–99)
GLUCOSE UR STRIP.AUTO-MCNC: NEGATIVE MG/DL
HCG UR QL: NEGATIVE
HCT VFR BLD AUTO: 48.9 % (ref 37–47)
HGB BLD-MCNC: 16 G/DL (ref 12–16)
HIV 1+2 AB+HIV1 P24 AG SERPL QL IA: NORMAL
HYALINE CASTS #/AREA URNS LPF: ABNORMAL /LPF
IMM GRANULOCYTES # BLD AUTO: 0.02 K/UL (ref 0–0.11)
IMM GRANULOCYTES NFR BLD AUTO: 0.3 % (ref 0–0.9)
KETONES UR STRIP.AUTO-MCNC: ABNORMAL MG/DL
LEUKOCYTE ESTERASE UR QL STRIP.AUTO: ABNORMAL
LYMPHOCYTES # BLD AUTO: 2.53 K/UL (ref 1–4.8)
LYMPHOCYTES NFR BLD: 37.2 % (ref 22–41)
MCH RBC QN AUTO: 29.1 PG (ref 27–33)
MCHC RBC AUTO-ENTMCNC: 32.7 G/DL (ref 33.6–35)
MCV RBC AUTO: 88.9 FL (ref 81.4–97.8)
MICRO URNS: ABNORMAL
MONOCYTES # BLD AUTO: 0.58 K/UL (ref 0–0.85)
MONOCYTES NFR BLD AUTO: 8.5 % (ref 0–13.4)
N GONORRHOEA DNA SPEC QL NAA+PROBE: POSITIVE
NEUTROPHILS # BLD AUTO: 3.24 K/UL (ref 2–7.15)
NEUTROPHILS NFR BLD: 47.5 % (ref 44–72)
NITRITE UR QL STRIP.AUTO: NEGATIVE
NRBC # BLD AUTO: 0 K/UL
NRBC BLD-RTO: 0 /100 WBC
PH UR STRIP.AUTO: 5.5 [PH] (ref 5–8)
PLATELET # BLD AUTO: 338 K/UL (ref 164–446)
PMV BLD AUTO: 8.4 FL (ref 9–12.9)
POTASSIUM SERPL-SCNC: 4 MMOL/L (ref 3.6–5.5)
PROT SERPL-MCNC: 6.9 G/DL (ref 6–8.2)
PROT UR QL STRIP: 30 MG/DL
RBC # BLD AUTO: 5.5 M/UL (ref 4.2–5.4)
RBC # URNS HPF: ABNORMAL /HPF
RBC UR QL AUTO: ABNORMAL
SODIUM SERPL-SCNC: 135 MMOL/L (ref 135–145)
SP GR UR STRIP.AUTO: 1.03
SPECIMEN SOURCE: ABNORMAL
T VAGINALIS DNA VAG QL PROBE+SIG AMP: NEGATIVE
TREPONEMA PALLIDUM IGG+IGM AB [PRESENCE] IN SERUM OR PLASMA BY IMMUNOASSAY: NORMAL
UROBILINOGEN UR STRIP.AUTO-MCNC: 1 MG/DL
WBC # BLD AUTO: 6.8 K/UL (ref 4.8–10.8)
WBC #/AREA URNS HPF: ABNORMAL /HPF

## 2021-08-14 PROCEDURE — 86780 TREPONEMA PALLIDUM: CPT

## 2021-08-14 PROCEDURE — 85025 COMPLETE CBC W/AUTO DIFF WBC: CPT

## 2021-08-14 PROCEDURE — A9270 NON-COVERED ITEM OR SERVICE: HCPCS | Performed by: EMERGENCY MEDICINE

## 2021-08-14 PROCEDURE — 700102 HCHG RX REV CODE 250 W/ 637 OVERRIDE(OP): Performed by: EMERGENCY MEDICINE

## 2021-08-14 PROCEDURE — 87389 HIV-1 AG W/HIV-1&-2 AB AG IA: CPT

## 2021-08-14 PROCEDURE — 87591 N.GONORRHOEAE DNA AMP PROB: CPT

## 2021-08-14 PROCEDURE — 87660 TRICHOMONAS VAGIN DIR PROBE: CPT

## 2021-08-14 PROCEDURE — 700101 HCHG RX REV CODE 250: Performed by: EMERGENCY MEDICINE

## 2021-08-14 PROCEDURE — 87480 CANDIDA DNA DIR PROBE: CPT

## 2021-08-14 PROCEDURE — 96372 THER/PROPH/DIAG INJ SC/IM: CPT | Mod: EDC

## 2021-08-14 PROCEDURE — 700111 HCHG RX REV CODE 636 W/ 250 OVERRIDE (IP): Performed by: EMERGENCY MEDICINE

## 2021-08-14 PROCEDURE — 87491 CHLMYD TRACH DNA AMP PROBE: CPT

## 2021-08-14 PROCEDURE — 80053 COMPREHEN METABOLIC PANEL: CPT

## 2021-08-14 PROCEDURE — 87510 GARDNER VAG DNA DIR PROBE: CPT

## 2021-08-14 RX ORDER — DOXYCYCLINE 100 MG/1
100 CAPSULE ORAL 2 TIMES DAILY
Qty: 14 CAPSULE | Refills: 0 | Status: SHIPPED | OUTPATIENT
Start: 2021-08-14 | End: 2021-08-21

## 2021-08-14 RX ORDER — CEFTRIAXONE 500 MG/1
500 INJECTION, POWDER, FOR SOLUTION INTRAMUSCULAR; INTRAVENOUS ONCE
Status: COMPLETED | OUTPATIENT
Start: 2021-08-14 | End: 2021-08-14

## 2021-08-14 RX ORDER — DOXYCYCLINE 100 MG/1
100 TABLET ORAL ONCE
Status: COMPLETED | OUTPATIENT
Start: 2021-08-14 | End: 2021-08-14

## 2021-08-14 RX ADMIN — LIDOCAINE HYDROCHLORIDE 1 ML: 10 INJECTION, SOLUTION INFILTRATION; PERINEURAL at 00:45

## 2021-08-14 RX ADMIN — DOXYCYCLINE 100 MG: 100 TABLET, FILM COATED ORAL at 00:45

## 2021-08-14 RX ADMIN — CEFTRIAXONE SODIUM 500 MG: 500 INJECTION, POWDER, FOR SOLUTION INTRAMUSCULAR; INTRAVENOUS at 00:45

## 2021-08-14 NOTE — ED NOTES
Pt's number [phone]. Pt reports being safe at home. Pt educated on f/u care, reasons for return, medications, safe sex practices, and discharge instructions. Pt verbalized understanding and reported no further questions.

## 2021-08-14 NOTE — ED NOTES
Pt ambulated up to the , Pt concerned over wait time, triage process was explained to patient along with wait time. Apologized for the wait time. Pt stated that she was going to step outside for a bit.

## 2021-08-14 NOTE — ED PROVIDER NOTES
ED Provider Note    CHIEF COMPLAINT  Vaginal discharge and bleeding, pelvic pain    HPI  Jg Thompson is a 36 y.o. female who presents to the emergency department for evaluation of vaginal discharge bleeding and pelvic pain.  The patient states that she has had 3 weeks of foul-smelling vaginal discharge and some bleeding.  She states that her  is currently being evaluated in the emergency department for gonorrhea and chlamydia.  She is sexually active with him and does not use protection.  She admits to lower pelvic pain as well.  She denies any fevers.  She denies any nausea or vomiting.  She denies any dysuria.  She does not have any upper abdominal pain, chest pain, shortness of breath, coughing, wheezing, congestion, runny nose.    REVIEW OF SYSTEMS  See HPI for further details. All other systems are negative.     PAST MEDICAL HISTORY   has a past medical history of Anxiety, Depression, Herpes, Lumbosacral radiculitis (1/30/2018), Other fatigue (7/30/2018), and Substance abuse ([facility]).    SOCIAL HISTORY  Social History     Tobacco Use   • Smoking status: Current Every Day Smoker     Packs/day: 0.50     Types: Cigarettes   • Smokeless tobacco: Never Used   • Tobacco comment: vape   Vaping Use   • Vaping Use: Every day   • Substances: Nicotine, Flavoring   • Devices: Refillable tank   Substance and Sexual Activity   • Alcohol use: Not Currently     Comment: occasional   • Drug use: No     Types: Methamphetamines     Comment: lasted used 2 yrs ago, IV user. every day.    • Sexual activity: Yes     Partners: Male       SURGICAL HISTORY   has a past surgical history that includes tonsillectomy.    CURRENT MEDICATIONS  Home Medications     Reviewed by Isadora Jama R.N. (Registered Nurse) on 08/13/21 at 2210  Med List Status: Partial   Medication Last Dose Status   omeprazole (PRILOSEC) 20 MG delayed-release capsule  Active   ondansetron (ZOFRAN ODT) 4 MG TABLET DISPERSIBLE  Active           "      ALLERGIES  Allergies   Allergen Reactions   • Penicillins        PHYSICAL EXAM  VITAL SIGNS: /87   Pulse 85   Temp 35.9 °C (96.7 °F) (Temporal)   Ht 1.702 m (5' 7\")   Wt 99 kg (218 lb 4.1 oz)   SpO2 99%   BMI 34.18 kg/m²    Constitutional: Alert and in no apparent distress.  HENT: Normocephalic atraumatic. Bilateral external ears normal. Nose normal. Mucous membranes are moist.  Eyes: Pupils are equal and reactive. Conjunctiva normal. Non-icteric sclera.   Neck: Normal range of motion without tenderness. Supple. No meningeal signs.  Cardiovascular: Regular rate and rhythm. No murmurs, gallops or rubs.  Thorax & Lungs: Breath sounds are clear to auscultation bilaterally. No wheezing, rhonchi or rales.  Abdomen: Soft, nontender and nondistended. No peritoneal signs noted.  Pelvic: Chaperone - Chay, ED tech.  Normal external genitalia.  There is a small amount of bloody, purulent discharge from the cervix.  Cervix is erythematous.  IUD strings are noted at the cervical os.  No CMT.  There is some mild right adnexal tenderness.  No adnexal masses are noted.  Skin: Warm and dry. No rashes are noted.  Back: No bony tenderness, No CVA tenderness.   Extremities: 2+ peripheral pulses. Cap refill is less than 2 seconds. No edema, cyanosis, or clubbing.  Musculoskeletal: Good range of motion in all major joints. No tenderness to palpation or major deformities noted.   Neurologic: Alert and oriented ×3. The patient moves all 4 extremities and follows commands.  Psychiatric: Affect is anxious. Judgment appears to be intact.    DIAGNOSTIC STUDIES / PROCEDURES    LABS  Results for orders placed or performed during the hospital encounter of 08/13/21   URINALYSIS CULTURE, IF INDICATED    Specimen: Urine   Result Value Ref Range    Color Yellow     Character Cloudy (A)     Specific Gravity 1.026 <1.035    Ph 5.5 5.0 - 8.0    Glucose Negative Negative mg/dL    Ketones Trace (A) Negative mg/dL    Protein 30 (A) " Negative mg/dL    Bilirubin Negative Negative    Urobilinogen, Urine 1.0 Negative    Nitrite Negative Negative    Leukocyte Esterase Small (A) Negative    Occult Blood Large (A) Negative    Micro Urine Req Microscopic    HCG QUALITATIVE UR   Result Value Ref Range    Beta-Hcg Urine Negative Negative   URINE MICROSCOPIC (W/UA)   Result Value Ref Range    WBC 10-20 (A) /hpf    RBC 2-5 (A) /hpf    Bacteria Few (A) None /hpf    Epithelial Cells Few /hpf    Hyaline Cast 6-10 (A) /lpf   URINE CULTURE(NEW)    Specimen: Urine   Result Value Ref Range    Significant Indicator NEG     Source UR     Site -     Culture Result -    CHLAMYDIA & GC BY PCR    Specimen: Genital   Result Value Ref Range    Source Endo/Cervical    CBC WITH DIFFERENTIAL   Result Value Ref Range    WBC 6.8 4.8 - 10.8 K/uL    RBC 5.50 (H) 4.20 - 5.40 M/uL    Hemoglobin 16.0 12.0 - 16.0 g/dL    Hematocrit 48.9 (H) 37.0 - 47.0 %    MCV 88.9 81.4 - 97.8 fL    MCH 29.1 27.0 - 33.0 pg    MCHC 32.7 (L) 33.6 - 35.0 g/dL    RDW 42.5 35.9 - 50.0 fL    Platelet Count 338 164 - 446 K/uL    MPV 8.4 (L) 9.0 - 12.9 fL    Neutrophils-Polys 47.50 44.00 - 72.00 %    Lymphocytes 37.20 22.00 - 41.00 %    Monocytes 8.50 0.00 - 13.40 %    Eosinophils 4.60 0.00 - 6.90 %    Basophils 1.90 (H) 0.00 - 1.80 %    Immature Granulocytes 0.30 0.00 - 0.90 %    Nucleated RBC 0.00 /100 WBC    Neutrophils (Absolute) 3.24 2.00 - 7.15 K/uL    Lymphs (Absolute) 2.53 1.00 - 4.80 K/uL    Monos (Absolute) 0.58 0.00 - 0.85 K/uL    Eos (Absolute) 0.31 0.00 - 0.51 K/uL    Baso (Absolute) 0.13 (H) 0.00 - 0.12 K/uL    Immature Granulocytes (abs) 0.02 0.00 - 0.11 K/uL    NRBC (Absolute) 0.00 K/uL   COMP METABOLIC PANEL   Result Value Ref Range    Sodium 135 135 - 145 mmol/L    Potassium 4.0 3.6 - 5.5 mmol/L    Chloride 101 96 - 112 mmol/L    Co2 23 20 - 33 mmol/L    Anion Gap 11.0 7.0 - 16.0    Glucose 78 65 - 99 mg/dL    Bun 15 8 - 22 mg/dL    Creatinine 0.91 0.50 - 1.40 mg/dL    Calcium 8.8 8.5 -  10.5 mg/dL    AST(SGOT) 15 12 - 45 U/L    ALT(SGPT) 22 2 - 50 U/L    Alkaline Phosphatase 67 30 - 99 U/L    Total Bilirubin 0.2 0.1 - 1.5 mg/dL    Albumin 4.1 3.2 - 4.9 g/dL    Total Protein 6.9 6.0 - 8.2 g/dL    Globulin 2.8 1.9 - 3.5 g/dL    A-G Ratio 1.5 g/dL   VAGINAL PATHOGENS DNA PANEL   Result Value Ref Range    Candida species DNA Probe Negative Negative    Trichamonas vaginalis DNA Probe Negative Negative    Gardnerella vaginalis DNA Probe Negative Negative   ESTIMATED GFR   Result Value Ref Range    GFR If African American >60 >60 mL/min/1.73 m 2    GFR If Non African American >60 >60 mL/min/1.73 m 2     COURSE & MEDICAL DECISION MAKING  Pertinent Labs & Imaging studies reviewed. (See chart for details)    This is a 36-year-old female presenting to the emergency department for evaluation of vaginal discharge and pelvic pain.  On initial evaluation, the patient did not appear to be in any acute distress.  Her vital signs were normal.  Pelvic exam was notable for small amount of purulent, bloody discharge.  She had some mild right adnexal tenderness but no masses were noted.  Vaginal swabs were sent.  These were negative for Candida, trichomoniasis, and BV.  GC were pending.  The patient was treated empirically given her positive exposure.  Urinalysis appeared contaminated from the discharge and I am less concerned for UTI given the lack of dysuria.    I initially wanted to order a pelvic ultrasound given the right adnexal tenderness to rule out tubo-ovarian abscess.  However, the patient declined.  Her abdominal exam was benign she had no evidence of peritonitis or tenderness of the right upper quadrant concerning for Ulises-Ronnie Klever syndrome.  Additionally, her IUD strings were noted at the cervical os and I suspected it is in the appropriate location.  I discussed my concerns with her which she requested discharge at this time.  I also informed her that not all of her labs would be back by the time she  was discharged.  She understands a follow-up with her OB/GYN or primary care physician to get these results.  She understands return to the ED with any worsening signs or symptoms.    I verified that the patient was wearing a mask and I was wearing appropriate PPE every time I entered the room. The patient's mask was on the patient at all times during my encounter except for a brief view of the oropharynx.    FINAL IMPRESSION  1. STI (sexually transmitted infection)        PRESCRIPTIONS  Discharge Medication List as of 8/14/2021 12:51 AM      START taking these medications    Details   doxycycline (MONODOX) 100 MG capsule Take 1 Capsule by mouth 2 times a day for 7 days., Disp-14 Capsule, R-0, Print Rx Paper           FOLLOW UP  OLAMIDE WaltonRWongNWong  [address] [id number] [phone]    Call in 1 day  To schedule a follow up appointment    [facility], Emergency Dept  [address] [facility] [id number] [phone]  Go to   As needed      -DISCHARGE-    Electronically signed by: Mayda Mcwilliams D.O., 8/14/2021 12:13 AM

## 2021-08-14 NOTE — ED TRIAGE NOTES
"Chief Complaint   Patient presents with   • Exposure to STD     Patient states her  is here and he contracted Gonorrhea/Chlamydia and she needs to be treated too. Positive for discharge, odor, bleeding, and pelvic pain. Syed has been feeling like this for 3 weeks.   • Pelvic Pain       Pt is alert and oriented, speaking in full sentences, follows commands and responds appropriately to questions. NAD. Resp are even and unlabored. GCS 15.      Pt placed in lobby. Pt educated on triage process. Pt encouraged to alert staff for any changes.     Patient and staff wearing appropriate PPE    /89   Pulse 92   Temp 35.9 °C (96.7 °F) (Temporal)   Ht 1.702 m (5' 7\")   Wt 99 kg (218 lb 4.1 oz)   SpO2 98%   BMI 34.18 kg/m²   "

## 2021-08-16 LAB
BACTERIA UR CULT: NORMAL
SIGNIFICANT IND 70042: NORMAL
SITE SITE: NORMAL
SOURCE SOURCE: NORMAL

## 2021-08-17 NOTE — ED NOTES
"ED Positive Culture Follow-up/Notification Note:    Date: 8/17/2021     Patient seen in the ED on 8/13/2021 for vaginal discharge, bleeding and pelvic pain.     1. STI (sexually transmitted infection)          Given in the ED on 8/14/2021:          Discharge Medication List as of 8/14/2021 12:51 AM      START taking these medications    Details   doxycycline (MONODOX) 100 MG capsule Take 1 Capsule by mouth 2 times a day for 7 days., Disp-14 Capsule, R-0, Print Rx Paper             Allergies: Penicillins     Vitals:    08/13/21 2159 08/13/21 2204 08/14/21 0044   BP: 144/89  128/87   Pulse: 92  85   Temp: 35.9 °C (96.7 °F)     TempSrc: Temporal     SpO2: 98%  99%   Weight:  99 kg (218 lb 4.1 oz)    Height: 1.702 m (5' 7\")         Final cultures:   Results     Procedure Component Value Units Date/Time    URINE CULTURE(NEW) [[id number]] Collected: 08/13/21 2338    Order Status: Completed Specimen: Urine Updated: 08/16/21 0931     Significant Indicator NEG     Source UR     Site -     Culture Result Usual skin jil ,000 cfu/mL    Narrative:      Indication for culture:->Patient WITHOUT an indwelling Gerber  catheter in place with new onset of Dysuria, Frequency,  Urgency, and/or Suprapubic pain    CHLAMYDIA & GC BY PCR [[id number]]  (Abnormal) Collected: 08/14/21 0004    Order Status: Completed Specimen: Genital Updated: 08/14/21 1907     C. trachomatis by PCR POSITIVE     N. gonorrhoeae by PCR POSITIVE     Source Endo/Cervical    URINALYSIS CULTURE, IF INDICATED [[id number]]     Order Status: Sent Specimen: Urine     URINALYSIS CULTURE, IF INDICATED [[id number]]  (Abnormal) Collected: 08/13/21 2338    Order Status: Completed Specimen: Urine Updated: 08/14/21 0012     Color Yellow     Character Cloudy     Specific Gravity 1.026     Ph 5.5     Glucose Negative mg/dL      Ketones Trace mg/dL      Protein 30 mg/dL      Bilirubin Negative     Urobilinogen, Urine 1.0     Nitrite Negative     Leukocyte Esterase Small     " Occult Blood Large     Micro Urine Req Microscopic    Narrative:      Indication for culture:->Patient WITHOUT an indwelling Gerber  catheter in place with new onset of Dysuria, Frequency,  Urgency, and/or Suprapubic pain    WET PREP [[id number]] Collected: 08/14/21 0000    Order Status: Canceled Specimen: Vaginal           Plan:   Appropriate antibiotic therapy prescribed to treat Chlamydia and Gonorrhea in the ER and upon discharge. No further treatment required. I have attempted to contact the patient, but there is no answer. Left a message to return call for results. I will also send her a letter to notify of result and encourage to follow up with the Health Dept for further information and testing.     Mariluz Donald, PharmD  PGY2 Infectious Diseases Pharmacy Resident

## 2022-01-31 ENCOUNTER — APPOINTMENT (OUTPATIENT)
Dept: RADIOLOGY | Facility: MEDICAL CENTER | Age: 37
End: 2022-01-31
Attending: EMERGENCY MEDICINE
Payer: COMMERCIAL

## 2022-01-31 ENCOUNTER — HOSPITAL ENCOUNTER (EMERGENCY)
Facility: MEDICAL CENTER | Age: 37
End: 2022-01-31
Attending: STUDENT IN AN ORGANIZED HEALTH CARE EDUCATION/TRAINING PROGRAM
Payer: COMMERCIAL

## 2022-01-31 ENCOUNTER — HOSPITAL ENCOUNTER (EMERGENCY)
Facility: MEDICAL CENTER | Age: 37
End: 2022-01-31
Attending: EMERGENCY MEDICINE
Payer: COMMERCIAL

## 2022-01-31 VITALS
OXYGEN SATURATION: 100 % | TEMPERATURE: 98 F | DIASTOLIC BLOOD PRESSURE: 75 MMHG | BODY MASS INDEX: 33.34 KG/M2 | RESPIRATION RATE: 18 BRPM | WEIGHT: 220 LBS | HEART RATE: 94 BPM | HEIGHT: 68 IN | SYSTOLIC BLOOD PRESSURE: 123 MMHG

## 2022-01-31 VITALS
SYSTOLIC BLOOD PRESSURE: 111 MMHG | WEIGHT: 205 LBS | HEART RATE: 81 BPM | BODY MASS INDEX: 31.07 KG/M2 | HEIGHT: 68 IN | RESPIRATION RATE: 20 BRPM | OXYGEN SATURATION: 98 % | TEMPERATURE: 96.8 F | DIASTOLIC BLOOD PRESSURE: 65 MMHG

## 2022-01-31 DIAGNOSIS — S02.2XXA CLOSED FRACTURE OF NASAL BONE, INITIAL ENCOUNTER: ICD-10-CM

## 2022-01-31 DIAGNOSIS — S09.90XA CLOSED HEAD INJURY, INITIAL ENCOUNTER: ICD-10-CM

## 2022-01-31 DIAGNOSIS — S12.001D CLOSED NONDISPLACED FRACTURE OF FIRST CERVICAL VERTEBRA WITH ROUTINE HEALING, UNSPECIFIED FRACTURE MORPHOLOGY, SUBSEQUENT ENCOUNTER: ICD-10-CM

## 2022-01-31 DIAGNOSIS — S22.060A COMPRESSION FRACTURE OF T8 VERTEBRA, INITIAL ENCOUNTER (HCC): ICD-10-CM

## 2022-01-31 DIAGNOSIS — S12.031A CLOSED NONDISPLACED FRACTURE OF POSTERIOR ARCH OF FIRST CERVICAL VERTEBRA, INITIAL ENCOUNTER (HCC): Primary | ICD-10-CM

## 2022-01-31 DIAGNOSIS — S01.01XA LACERATION OF SCALP, INITIAL ENCOUNTER: ICD-10-CM

## 2022-01-31 DIAGNOSIS — S99.912A INJURY OF LEFT ANKLE, INITIAL ENCOUNTER: ICD-10-CM

## 2022-01-31 DIAGNOSIS — S13.4XXA WHIPLASH INJURIES, INITIAL ENCOUNTER: ICD-10-CM

## 2022-01-31 DIAGNOSIS — S22.050A COMPRESSION FRACTURE OF T6 VERTEBRA, INITIAL ENCOUNTER (HCC): ICD-10-CM

## 2022-01-31 DIAGNOSIS — R52 ACUTE PAIN: ICD-10-CM

## 2022-01-31 DIAGNOSIS — T07.XXXA MULTIPLE ABRASIONS: ICD-10-CM

## 2022-01-31 LAB
ABO GROUP BLD: NORMAL
ALBUMIN SERPL BCP-MCNC: 4.1 G/DL (ref 3.2–4.9)
ALBUMIN/GLOB SERPL: 1.6 G/DL
ALP SERPL-CCNC: 73 U/L (ref 30–99)
ALT SERPL-CCNC: 29 U/L (ref 2–50)
ANION GAP SERPL CALC-SCNC: 13 MMOL/L (ref 7–16)
APTT PPP: 26.7 SEC (ref 24.7–36)
AST SERPL-CCNC: 25 U/L (ref 12–45)
BILIRUB SERPL-MCNC: 0.3 MG/DL (ref 0.1–1.5)
BLD GP AB SCN SERPL QL: NORMAL
BUN SERPL-MCNC: 18 MG/DL (ref 8–22)
CALCIUM SERPL-MCNC: 9.1 MG/DL (ref 8.5–10.5)
CHLORIDE SERPL-SCNC: 104 MMOL/L (ref 96–112)
CO2 SERPL-SCNC: 20 MMOL/L (ref 20–33)
CREAT SERPL-MCNC: 0.86 MG/DL (ref 0.5–1.4)
ERYTHROCYTE [DISTWIDTH] IN BLOOD BY AUTOMATED COUNT: 40.1 FL (ref 35.9–50)
ETHANOL BLD-MCNC: <10.1 MG/DL (ref 0–10)
GLOBULIN SER CALC-MCNC: 2.6 G/DL (ref 1.9–3.5)
GLUCOSE SERPL-MCNC: 120 MG/DL (ref 65–99)
HCG SERPL QL: NEGATIVE
HCT VFR BLD AUTO: 46.1 % (ref 37–47)
HGB BLD-MCNC: 15.8 G/DL (ref 12–16)
INR PPP: 0.96 (ref 0.87–1.13)
MCH RBC QN AUTO: 28.9 PG (ref 27–33)
MCHC RBC AUTO-ENTMCNC: 34.3 G/DL (ref 33.6–35)
MCV RBC AUTO: 84.4 FL (ref 81.4–97.8)
PLATELET # BLD AUTO: 307 K/UL (ref 164–446)
PMV BLD AUTO: 8.5 FL (ref 9–12.9)
POTASSIUM SERPL-SCNC: 4.1 MMOL/L (ref 3.6–5.5)
PROT SERPL-MCNC: 6.7 G/DL (ref 6–8.2)
PROTHROMBIN TIME: 12.5 SEC (ref 12–14.6)
RBC # BLD AUTO: 5.46 M/UL (ref 4.2–5.4)
RH BLD: NORMAL
SODIUM SERPL-SCNC: 137 MMOL/L (ref 135–145)
WBC # BLD AUTO: 10.9 K/UL (ref 4.8–10.8)

## 2022-01-31 PROCEDURE — 305948 HCHG GREEN TRAUMA ACT PRE-NOTIFY NO CC

## 2022-01-31 PROCEDURE — 71260 CT THORAX DX C+: CPT

## 2022-01-31 PROCEDURE — 71045 X-RAY EXAM CHEST 1 VIEW: CPT

## 2022-01-31 PROCEDURE — 700101 HCHG RX REV CODE 250: Performed by: STUDENT IN AN ORGANIZED HEALTH CARE EDUCATION/TRAINING PROGRAM

## 2022-01-31 PROCEDURE — 72170 X-RAY EXAM OF PELVIS: CPT

## 2022-01-31 PROCEDURE — 72128 CT CHEST SPINE W/O DYE: CPT

## 2022-01-31 PROCEDURE — 304999 HCHG REPAIR-SIMPLE/INTERMED LEVEL 1

## 2022-01-31 PROCEDURE — 700111 HCHG RX REV CODE 636 W/ 250 OVERRIDE (IP): Performed by: EMERGENCY MEDICINE

## 2022-01-31 PROCEDURE — 86901 BLOOD TYPING SEROLOGIC RH(D): CPT

## 2022-01-31 PROCEDURE — 70486 CT MAXILLOFACIAL W/O DYE: CPT

## 2022-01-31 PROCEDURE — 73610 X-RAY EXAM OF ANKLE: CPT | Mod: LT

## 2022-01-31 PROCEDURE — 86850 RBC ANTIBODY SCREEN: CPT

## 2022-01-31 PROCEDURE — 96376 TX/PRO/DX INJ SAME DRUG ADON: CPT

## 2022-01-31 PROCEDURE — 84703 CHORIONIC GONADOTROPIN ASSAY: CPT

## 2022-01-31 PROCEDURE — 304217 HCHG IRRIGATION SYSTEM

## 2022-01-31 PROCEDURE — 700117 HCHG RX CONTRAST REV CODE 255: Performed by: EMERGENCY MEDICINE

## 2022-01-31 PROCEDURE — 80053 COMPREHEN METABOLIC PANEL: CPT

## 2022-01-31 PROCEDURE — 85610 PROTHROMBIN TIME: CPT

## 2022-01-31 PROCEDURE — 85027 COMPLETE CBC AUTOMATED: CPT

## 2022-01-31 PROCEDURE — 72131 CT LUMBAR SPINE W/O DYE: CPT

## 2022-01-31 PROCEDURE — 70498 CT ANGIOGRAPHY NECK: CPT

## 2022-01-31 PROCEDURE — 305308 HCHG STAPLER,SKIN,DISP.

## 2022-01-31 PROCEDURE — A9270 NON-COVERED ITEM OR SERVICE: HCPCS | Performed by: STUDENT IN AN ORGANIZED HEALTH CARE EDUCATION/TRAINING PROGRAM

## 2022-01-31 PROCEDURE — 85730 THROMBOPLASTIN TIME PARTIAL: CPT

## 2022-01-31 PROCEDURE — 70450 CT HEAD/BRAIN W/O DYE: CPT

## 2022-01-31 PROCEDURE — 700101 HCHG RX REV CODE 250: Performed by: EMERGENCY MEDICINE

## 2022-01-31 PROCEDURE — 99285 EMERGENCY DEPT VISIT HI MDM: CPT

## 2022-01-31 PROCEDURE — 96374 THER/PROPH/DIAG INJ IV PUSH: CPT

## 2022-01-31 PROCEDURE — 86900 BLOOD TYPING SEROLOGIC ABO: CPT

## 2022-01-31 PROCEDURE — 82077 ASSAY SPEC XCP UR&BREATH IA: CPT

## 2022-01-31 PROCEDURE — 99283 EMERGENCY DEPT VISIT LOW MDM: CPT

## 2022-01-31 PROCEDURE — 700102 HCHG RX REV CODE 250 W/ 637 OVERRIDE(OP): Performed by: STUDENT IN AN ORGANIZED HEALTH CARE EDUCATION/TRAINING PROGRAM

## 2022-01-31 PROCEDURE — 72125 CT NECK SPINE W/O DYE: CPT

## 2022-01-31 PROCEDURE — 96375 TX/PRO/DX INJ NEW DRUG ADDON: CPT

## 2022-01-31 RX ORDER — FLUOXETINE HYDROCHLORIDE 20 MG/1
40 CAPSULE ORAL DAILY
Status: SHIPPED | COMMUNITY
End: 2023-07-11

## 2022-01-31 RX ORDER — OXYCODONE HYDROCHLORIDE 5 MG/1
5 TABLET ORAL ONCE
Status: COMPLETED | OUTPATIENT
Start: 2022-01-31 | End: 2022-01-31

## 2022-01-31 RX ORDER — LIDOCAINE HYDROCHLORIDE AND EPINEPHRINE BITARTRATE 20; .01 MG/ML; MG/ML
20 INJECTION, SOLUTION SUBCUTANEOUS ONCE
Status: COMPLETED | OUTPATIENT
Start: 2022-01-31 | End: 2022-01-31

## 2022-01-31 RX ORDER — HYDROCODONE BITARTRATE AND ACETAMINOPHEN 5; 325 MG/1; MG/1
1 TABLET ORAL EVERY 4 HOURS PRN
Qty: 20 TABLET | Refills: 0 | Status: SHIPPED | OUTPATIENT
Start: 2022-01-31 | End: 2022-02-05

## 2022-01-31 RX ORDER — HYDROCODONE BITARTRATE AND ACETAMINOPHEN 5; 325 MG/1; MG/1
1 TABLET ORAL EVERY 4 HOURS PRN
Qty: 20 TABLET | Refills: 0 | Status: SHIPPED | OUTPATIENT
Start: 2022-01-31 | End: 2022-01-31 | Stop reason: SDUPTHER

## 2022-01-31 RX ORDER — DIPHENHYDRAMINE HCL 12.5MG/5ML
6.25 LIQUID (ML) ORAL ONCE
Status: COMPLETED | OUTPATIENT
Start: 2022-01-31 | End: 2022-01-31

## 2022-01-31 RX ORDER — HYDROMORPHONE HYDROCHLORIDE 1 MG/ML
1 INJECTION, SOLUTION INTRAMUSCULAR; INTRAVENOUS; SUBCUTANEOUS ONCE
Status: COMPLETED | OUTPATIENT
Start: 2022-01-31 | End: 2022-01-31

## 2022-01-31 RX ORDER — HYDROXYZINE 50 MG/1
50 TABLET, FILM COATED ORAL ONCE
Status: COMPLETED | OUTPATIENT
Start: 2022-01-31 | End: 2022-01-31

## 2022-01-31 RX ORDER — ACETAMINOPHEN 500 MG
1000 TABLET ORAL ONCE
Status: COMPLETED | OUTPATIENT
Start: 2022-01-31 | End: 2022-01-31

## 2022-01-31 RX ORDER — IBUPROFEN 600 MG/1
600 TABLET ORAL ONCE
Status: COMPLETED | OUTPATIENT
Start: 2022-01-31 | End: 2022-01-31

## 2022-01-31 RX ORDER — KETOROLAC TROMETHAMINE 30 MG/ML
15 INJECTION, SOLUTION INTRAMUSCULAR; INTRAVENOUS ONCE
Status: COMPLETED | OUTPATIENT
Start: 2022-01-31 | End: 2022-01-31

## 2022-01-31 RX ADMIN — DIPHENHYDRAMINE HYDROCHLORIDE 6.25 MG: 12.5 SOLUTION ORAL at 19:30

## 2022-01-31 RX ADMIN — TETRACAINE HCL 10 ML: 10 INJECTION SUBARACHNOID at 04:18

## 2022-01-31 RX ADMIN — IBUPROFEN 600 MG: 600 TABLET, FILM COATED ORAL at 19:30

## 2022-01-31 RX ADMIN — FENTANYL CITRATE 100 MCG: 50 INJECTION, SOLUTION INTRAMUSCULAR; INTRAVENOUS at 02:19

## 2022-01-31 RX ADMIN — IOHEXOL 100 ML: 350 INJECTION, SOLUTION INTRAVENOUS at 02:25

## 2022-01-31 RX ADMIN — LIDOCAINE HYDROCHLORIDE AND EPINEPHRINE 20 ML: 20; 10 INJECTION, SOLUTION INFILTRATION; PERINEURAL at 03:32

## 2022-01-31 RX ADMIN — IOHEXOL 50 ML: 350 INJECTION, SOLUTION INTRAVENOUS at 03:15

## 2022-01-31 RX ADMIN — ACETAMINOPHEN 1000 MG: 500 TABLET ORAL at 19:30

## 2022-01-31 RX ADMIN — KETOROLAC TROMETHAMINE 15 MG: 30 INJECTION, SOLUTION INTRAMUSCULAR at 06:00

## 2022-01-31 RX ADMIN — OXYCODONE HYDROCHLORIDE 5 MG: 5 TABLET ORAL at 19:30

## 2022-01-31 RX ADMIN — HYDROMORPHONE HYDROCHLORIDE 1 MG: 1 INJECTION, SOLUTION INTRAMUSCULAR; INTRAVENOUS; SUBCUTANEOUS at 04:18

## 2022-01-31 RX ADMIN — FENTANYL CITRATE 50 MCG: 50 INJECTION, SOLUTION INTRAMUSCULAR; INTRAVENOUS at 03:15

## 2022-01-31 RX ADMIN — HYDROXYZINE HYDROCHLORIDE 50 MG: 50 TABLET, FILM COATED ORAL at 19:56

## 2022-01-31 ASSESSMENT — PAIN DESCRIPTION - PAIN TYPE: TYPE: ACUTE PAIN

## 2022-01-31 ASSESSMENT — FIBROSIS 4 INDEX: FIB4 SCORE: 0.54

## 2022-01-31 NOTE — ED NOTES
Pt is alert and oriented, speaking in full sentences, follows commands and responds appropriately to questions. Resp are even and unlabored. On cardiac monitor. Pain Rx given see MAR. On RA

## 2022-01-31 NOTE — ED NOTES
Walking boot and [place] J collar applied.  Patient given crutches, will educate patient on use once patient is more alert and able to ambulate.  Patient remains groggy from dilaudid administration.
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
## 2022-01-31 NOTE — ED NOTES
"Pt discharged to home w/ friend. Pt A&Ox4 on RA. IV discontinued and gauze placed, pt in possession of belongings. Pt provided discharge education and information pertaining to medications and follow up appointments. Pt demonstrated use of crutches. Pt tolerated well. Pt received copy of discharge instructions and verbalized understanding. /65   Pulse 81   Temp 36 °C (96.8 °F) (Temporal)   Resp 20   Ht 1.715 m (5' 7.5\")   Wt 93 kg (205 lb)   SpO2 98%   BMI 31.63 kg/m²   "
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
## 2022-01-31 NOTE — ED NOTES
Patient medicated per MAR for pain and LET applied to abrasions on BLE, VSS, NAD.  Patient placed on 3L/NC after Dilaudid administration prophylactically.
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
## 2022-02-01 NOTE — DISCHARGE INSTRUCTIONS
Take the following medications for pain/fever at home:  Acetaminophen (Tylenol): Take 650 mg (2 regular strength) every 6 hours. Do not take more than 3,000mg in a 24 hour period.   Ibuprofen: Take 400-600 mg (2-3 regular strength) every 6 hours. Take with food.   Alternate the two medications and you can take one of them every 3 hours.       Use these medications, then use the prescription medication you are prescribed at your last visit to the emergency department supplement.  Because this medication does contain acetaminophen, make sure you calculate that into your daily max dose.    Pain medication is not expected to take away all pain, but should take the edge.    You may use a small amount of children's Benadryl just to numb the inside of your mouth this should help with pain with your tongue to allow you to drink fluids.     Avoid spicy or acidic foods for the next several days until the tongue laceration heals as this will cause more pain.  We recommend puréed food for calories.    We recommend gentle stretching as able.  Follow-up with your doctor as outpatient for further care.  Return to the emergency department if you develop any new focal numbness or weakness.
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
## 2022-02-03 NOTE — DISCHARGE PLANNING
RN CM received VM from pt's mother, Starr, requesting medical documentation faxed to [facility] ([facility]) for referral. Pt has an appointment on 2/7/022 @ 5099. RN CM attempted to call referral department for [facility] at [phone]. No answer. Left VM.     Addendum:    1124: Called referral department again. No answer. Left VM.    1423: RN CM received incoming call from [facility] referral department, who then transferred me to Dr. Womack's . No answer. Left VM.     02/04/2022 @ 1420: Sent referral to [facility] for Dr. Womack fax# [phone].    02/04/2022 @ 2430: Sent referral to [facility] for Dr Jake Cantu fax# [phone]    02/08/2022 @ 1330: RN CM called [facility]. Per , [facility] does not accept the pt's insurance. Called pt's mother, Starr @ , to provide update. No answer. Left a VM.

## 2022-03-14 ENCOUNTER — APPOINTMENT (OUTPATIENT)
Dept: PHYSICAL THERAPY | Facility: REHABILITATION | Age: 37
End: 2022-03-14
Attending: PHYSICIAN ASSISTANT
Payer: COMMERCIAL

## 2022-03-16 ENCOUNTER — PHYSICAL THERAPY (OUTPATIENT)
Dept: PHYSICAL THERAPY | Facility: REHABILITATION | Age: 37
End: 2022-03-16
Attending: PHYSICIAN ASSISTANT
Payer: COMMERCIAL

## 2022-03-16 DIAGNOSIS — M54.2 CERVICALGIA: ICD-10-CM

## 2022-03-16 PROCEDURE — 97161 PT EVAL LOW COMPLEX 20 MIN: CPT

## 2022-03-16 ASSESSMENT — ENCOUNTER SYMPTOMS
PAIN SCALE AT LOWEST: 0
PAIN SCALE AT HIGHEST: 8
PAIN SCALE: 4
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
## 2022-03-25 ENCOUNTER — NON-PROVIDER VISIT (OUTPATIENT)
Dept: WOUND CARE | Facility: MEDICAL CENTER | Age: 37
End: 2022-03-25
Attending: STUDENT IN AN ORGANIZED HEALTH CARE EDUCATION/TRAINING PROGRAM
Payer: COMMERCIAL

## 2022-04-13 ENCOUNTER — PHYSICAL THERAPY (OUTPATIENT)
Dept: PHYSICAL THERAPY | Facility: REHABILITATION | Age: 37
End: 2022-04-13
Attending: PHYSICIAN ASSISTANT
Payer: COMMERCIAL

## 2022-04-13 DIAGNOSIS — M54.2 CERVICALGIA: ICD-10-CM

## 2022-04-13 PROCEDURE — 97110 THERAPEUTIC EXERCISES: CPT

## 2022-04-13 NOTE — OP THERAPY DAILY TREATMENT
Outpatient Physical Therapy  DAILY TREATMENT     [facility] Outpatient Physical Therapy [place]  [address], [address]  [facility] [address]  Phone:  [phone]  Fax:  [phone]    Date: 04/13/2022    Patient: Jg Thompson  YOB: 1985  MRN: [id number]     Time Calculation    Start time: 0135  Stop time: 0215 Time Calculation (min): 40 minutes         Chief Complaint: neck problem, misc  Visit #: 2    SUBJECTIVE:  Reports overall improving neck and head pain. Notes she needs knee surgery x 2 and is waiting for that to start next month. Also has a ankle/foot referral for PT.     OBJECTIVE:  Current objective measures: Flexion: decreased (40 degrees with pain)  Extension: decreased (45 degrees without pain)  Retraction: decreased  Left lateral flexion: within functional limits (25 degrees)  Right lateral flexion: Active right cervical lateral flexion: 25 degrees.  Left rotation: decreased (55 degrees)  Right rotation: decreased (60 degrees)  DNF MMT: 4-/5           Therapeutic Exercises (CPT 01432):     1. Occ float , x 60 rotations    2. Supine foam roller demo, 2min    3. Chin tuck head lift, 3 sec x 10    4. Seated 1st rib hold cervical sidebend, 20 sec x 4    5. Discussion on recreactional fittness at tolerated levels without increasing pan, 3min    6. Cervical arom, x 10, 3 ways      Therapeutic Exercise Summary: HEP instruction/performance and development. Handout provided and exercises located below:  Access Code: [email or web address]  URL: [email or web address]  Date: 04/13/2022  Prepared by: Efe Bennett    Exercises        yes, no maybe so - 15 x daily - 3 reps      Standing Cervical Retraction - 3 x daily - 10 reps      Time-based treatments/modalities:    Physical Therapy Timed Treatment Charges  Therapeutic exercise minutes (CPT 94670): 40 minutes      Pain rating (1-10) before treatment:  2  Pain rating (1-10) after treatment:  2 (neck)    ASSESSMENT:   Response to treatment: Overall  improving ROM. Cervical strength to advance as able. F/u next week for continued advancement    PLAN/RECOMMENDATIONS:   Plan for treatment: therapy treatment to continue next visit.  Planned interventions for next visit: continue with current treatment.

## 2022-04-20 ENCOUNTER — PHYSICAL THERAPY (OUTPATIENT)
Dept: PHYSICAL THERAPY | Facility: REHABILITATION | Age: 37
End: 2022-04-20
Attending: PHYSICIAN ASSISTANT
Payer: COMMERCIAL

## 2022-04-20 DIAGNOSIS — M54.2 CERVICALGIA: ICD-10-CM

## 2022-04-20 PROCEDURE — 97110 THERAPEUTIC EXERCISES: CPT

## 2022-04-20 NOTE — OP THERAPY DAILY TREATMENT
Outpatient Physical Therapy  DAILY TREATMENT     [facility] Outpatient Physical Therapy [place]  [address], [address]  [facility] [address]  Phone:  [phone]  Fax:  [phone]    Date: 04/20/2022    Patient: Jg Thompson  YOB: 1985  MRN: [id number]     Time Calculation    Start time: 1030  Stop time: 1115 Time Calculation (min): 45 minutes       Chief Complaint: neck problem, misc  Visit #: 3    SUBJECTIVE:  Reports overall doing better day to day with neck pain. Notes that one night she woke up with increased pain ans spasms but was able to fall back asleep and felt fine the next day.     OBJECTIVE:  Current objective measures: Flexion: decreased (45 degrees without pain)  Extension: decreased (45 degrees without pain)  Retraction: WNL  Left lateral flexion: within functional limits (25 degrees)  Right lateral flexion: Active right cervical lateral flexion: 25 degrees.  Left rotation: decreased (65 degrees)  Right rotation: decreased (65 degrees)  DNF MMT: 4-/5           Therapeutic Exercises (CPT 86317):     1. Occ float , x 60 rotations    2. Supine foam roller demo, 2min    3. Shoulder extension, blk band x 25    4. Seated 1st rib hold cervical sidebend, review    5. T band ER , pink x 25    6. Cervical arom, x 10, 3 ways    7. Supine half roller, 1min, soldiers x 20 each arm, HA green band x 20     8. Supine shoulder flexion , stick x 30    9. Wall ball pushups , x 20    10. Low row, with scap retraction purple band x 25      Therapeutic Exercise Summary: HEP instruction/performance and development. Handout provided and exercises located below:  Access Code: [email or web address]  URL: [email or web address]  Date: 04/13/2022  Prepared by: Efe Bennett    Exercises        yes, no maybe so - 15 x daily - 3 reps      Standing Cervical Retraction - 3 x daily - 10 reps      Time-based treatments/modalities:    Physical Therapy Timed Treatment Charges  Therapeutic exercise minutes (CPT 58324): 45  minutes      Pain rating (1-10) before treatment:  2  Pain rating (1-10) after treatment:  2 (neck)    ASSESSMENT:   Response to treatment: Overall improving ROM. Cervical strength advancing. F/u next week for continued advancement    PLAN/RECOMMENDATIONS:   Plan for treatment: therapy treatment to continue next visit.  Planned interventions for next visit: continue with current treatment.

## 2022-05-02 ENCOUNTER — PHYSICAL THERAPY (OUTPATIENT)
Dept: PHYSICAL THERAPY | Facility: REHABILITATION | Age: 37
End: 2022-05-02
Attending: PHYSICIAN ASSISTANT
Payer: COMMERCIAL

## 2022-05-02 DIAGNOSIS — M54.2 CERVICALGIA: ICD-10-CM

## 2022-05-02 PROCEDURE — 97110 THERAPEUTIC EXERCISES: CPT

## 2022-05-02 NOTE — OP THERAPY DAILY TREATMENT
Outpatient Physical Therapy  DAILY TREATMENT     [facility] [place]  [address]  [place] [address]  Phone:  [phone]  Fax:  [phone]    Date: 05/02/2022    Patient: Jg Thompson  YOB: 1985  MRN: [id number]     Time Calculation    Start time: 0900  Stop time: 0940 Time Calculation (min): 40 minutes       Chief Complaint: neck problem, misc  Visit #: 4    SUBJECTIVE:    Went to the neurosurgeon without any medical change. Has not been to see any other doctor since last appt. Is using muscle relaxer's sparingly as needed. Is waiting to see hear from orthopedist  doctor regarding her L knee ACL surgery.     OBJECTIVE:  Current objective measures: Flexion: decreased (45 degrees without pain)  Extension: decreased (45 degrees without pain)  Retraction: WNL  Left lateral flexion: within functional limits (25 degrees)  Right lateral flexion: Active right cervical lateral flexion: 25 degrees.  Left rotation: decreased (65 degrees)  Right rotation: decreased (65 degrees)  DNF MMT: 4-/5           Therapeutic Exercises (CPT 07862):     1. Occ float , x 60 rotations    3. Shoulder extension, blk band x 25    4. DNF head lifts, 5sec x 10    5. T band ER , pink x 25    6. Cervical arom, x 10, 3 ways    7. Supine half roller, 1min, soldiers x 20 each arm, HA green band x 20     8. Supine shoulder flexion , stick x 30    9. Wall ball pushups , x 20    10. Low row, with scap retraction purple band x 25      Therapeutic Exercise Summary: HEP instruction/performance and development. Handout provided and exercises located below:  Access Code: [email or web address]  URL: [email or web address]  Date: 04/13/2022  Prepared by: Efe Bennett    Exercises        yes, no maybe so - 15 x daily - 3 reps      Standing Cervical Retraction - 3 x daily - 10 reps      Time-based treatments/modalities:    Physical Therapy Timed Treatment Charges  Therapeutic exercise minutes (CPT 44836): 40  minutes      Pain rating (1-10) before treatment:  2  Pain rating (1-10) after treatment:  2 (neck)    ASSESSMENT:   Response to treatment: Overall improving ROM/ maintained since last visit. Cervical strength advancing without increased pain reported. F/u next week for continued advancement. Pt is approaching independence with exercises.     PLAN/RECOMMENDATIONS:   Plan for treatment: therapy treatment to continue next visit.  Planned interventions for next visit: continue with current treatment.

## 2022-05-11 ENCOUNTER — APPOINTMENT (OUTPATIENT)
Dept: PHYSICAL THERAPY | Facility: REHABILITATION | Age: 37
End: 2022-05-11
Attending: PHYSICIAN ASSISTANT
Payer: COMMERCIAL

## 2022-05-23 ENCOUNTER — APPOINTMENT (OUTPATIENT)
Dept: PHYSICAL THERAPY | Facility: REHABILITATION | Age: 37
End: 2022-05-23
Attending: PHYSICIAN ASSISTANT
Payer: COMMERCIAL

## 2022-05-31 ENCOUNTER — APPOINTMENT (OUTPATIENT)
Dept: PHYSICAL THERAPY | Facility: REHABILITATION | Age: 37
End: 2022-05-31
Attending: PHYSICIAN ASSISTANT
Payer: COMMERCIAL

## 2022-08-22 ENCOUNTER — APPOINTMENT (OUTPATIENT)
Dept: PHYSICAL THERAPY | Facility: REHABILITATION | Age: 37
End: 2022-08-22
Attending: STUDENT IN AN ORGANIZED HEALTH CARE EDUCATION/TRAINING PROGRAM
Payer: COMMERCIAL
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
## 2022-08-31 ENCOUNTER — TELEPHONE (OUTPATIENT)
Dept: PHYSICAL THERAPY | Facility: REHABILITATION | Age: 37
End: 2022-08-31
Payer: COMMERCIAL

## 2022-08-31 ENCOUNTER — PHYSICAL THERAPY (OUTPATIENT)
Dept: PHYSICAL THERAPY | Facility: REHABILITATION | Age: 37
End: 2022-08-31
Attending: STUDENT IN AN ORGANIZED HEALTH CARE EDUCATION/TRAINING PROGRAM
Payer: COMMERCIAL

## 2022-08-31 DIAGNOSIS — M23.90 INTERNAL DERANGEMENT OF KNEE, UNSPECIFIED LATERALITY: ICD-10-CM

## 2022-08-31 PROCEDURE — 97163 PT EVAL HIGH COMPLEX 45 MIN: CPT

## 2022-08-31 SDOH — ECONOMIC STABILITY: GENERAL: QUALITY OF LIFE: GOOD

## 2022-08-31 ASSESSMENT — ENCOUNTER SYMPTOMS
PAIN SCALE AT HIGHEST: 5
PAIN SCALE: 0
PAIN SCALE AT LOWEST: 0

## 2022-08-31 ASSESSMENT — ACTIVITIES OF DAILY LIVING (ADL): POOR_BALANCE: 1

## 2022-08-31 NOTE — OP THERAPY DISCHARGE SUMMARY
Outpatient Physical Therapy  DISCHARGE SUMMARY NOTE      [facility] Physical Therapy [address]  [address]  [place] [address]  Phone:  [phone]  Fax:  [phone]    Date of Visit: 08/31/2022    Patient: Jg Thompson  YOB: 1985  MRN: [id number]     Referring Provider: Audra Paul P.A.-C.   Referring Diagnosis M54.2 (ICD-10-CM) - Cervicalgia       Your patient is being discharged from Physical Therapy with the following comments:   Patient has failed to schedule or reschedule follow-up visits    Comments:  Patient failed to schedule additional appointments following last treatment session. Patient has been discharged from skilled services.       Montse Dunn, PT    Date: 8/31/2022

## 2022-09-01 ENCOUNTER — APPOINTMENT (OUTPATIENT)
Dept: PHYSICAL THERAPY | Facility: REHABILITATION | Age: 37
End: 2022-09-01
Attending: STUDENT IN AN ORGANIZED HEALTH CARE EDUCATION/TRAINING PROGRAM
Payer: COMMERCIAL

## 2022-09-15 ENCOUNTER — APPOINTMENT (OUTPATIENT)
Dept: PHYSICAL THERAPY | Facility: REHABILITATION | Age: 37
End: 2022-09-15
Attending: STUDENT IN AN ORGANIZED HEALTH CARE EDUCATION/TRAINING PROGRAM
Payer: COMMERCIAL

## 2022-09-19 ENCOUNTER — APPOINTMENT (OUTPATIENT)
Dept: PHYSICAL THERAPY | Facility: REHABILITATION | Age: 37
End: 2022-09-19
Attending: STUDENT IN AN ORGANIZED HEALTH CARE EDUCATION/TRAINING PROGRAM
Payer: COMMERCIAL

## 2022-09-26 ENCOUNTER — APPOINTMENT (OUTPATIENT)
Dept: PHYSICAL THERAPY | Facility: REHABILITATION | Age: 37
End: 2022-09-26
Attending: STUDENT IN AN ORGANIZED HEALTH CARE EDUCATION/TRAINING PROGRAM
Payer: COMMERCIAL

## 2022-09-28 ENCOUNTER — TELEPHONE (OUTPATIENT)
Dept: PHYSICAL THERAPY | Facility: REHABILITATION | Age: 37
End: 2022-09-28
Payer: COMMERCIAL

## 2022-09-28 NOTE — OP THERAPY DISCHARGE SUMMARY
Outpatient Physical Therapy  DISCHARGE SUMMARY NOTE      [facility] Physical Therapy [address]  [address]  [place] [address]  Phone:  [phone]  Fax:  [phone]    Date of Visit: 09/28/2022    Patient: Jg Thompson  YOB: 1985  MRN: [id number]     Referring Provider: DORA Hendricks   Referring Diagnosis S83.512A (ICD-10-CM) - Sprain of anterior cruciate ligament of left knee, initial encounter       Your patient is being discharged from Physical Therapy with the following comments:   Goals not met    Comments:  Pt is self discharged at this time d/t our late cancel or no show policy. They were advised in previous sessions to cont compliance to HEP and contact PT as needed w/ any questions or concerns. Pt may return to PT in future as needed w/ new referral and check in w/referring provider. Clinic attempted to call patient several times to notify about first no-show and remind her about policy.    Montse Dunn, PT    Date: 9/28/2022

## 2022-10-03 ENCOUNTER — APPOINTMENT (OUTPATIENT)
Dept: PHYSICAL THERAPY | Facility: REHABILITATION | Age: 37
End: 2022-10-03
Attending: STUDENT IN AN ORGANIZED HEALTH CARE EDUCATION/TRAINING PROGRAM
Payer: COMMERCIAL

## 2022-10-07 ENCOUNTER — APPOINTMENT (OUTPATIENT)
Dept: PHYSICAL THERAPY | Facility: REHABILITATION | Age: 37
End: 2022-10-07
Attending: STUDENT IN AN ORGANIZED HEALTH CARE EDUCATION/TRAINING PROGRAM
Payer: COMMERCIAL

## 2022-10-10 ENCOUNTER — APPOINTMENT (OUTPATIENT)
Dept: PHYSICAL THERAPY | Facility: REHABILITATION | Age: 37
End: 2022-10-10
Attending: STUDENT IN AN ORGANIZED HEALTH CARE EDUCATION/TRAINING PROGRAM
Payer: COMMERCIAL

## 2022-10-12 ENCOUNTER — APPOINTMENT (OUTPATIENT)
Dept: PHYSICAL THERAPY | Facility: REHABILITATION | Age: 37
End: 2022-10-12
Attending: STUDENT IN AN ORGANIZED HEALTH CARE EDUCATION/TRAINING PROGRAM
Payer: COMMERCIAL

## 2022-10-17 ENCOUNTER — APPOINTMENT (OUTPATIENT)
Dept: PHYSICAL THERAPY | Facility: REHABILITATION | Age: 37
End: 2022-10-17
Attending: STUDENT IN AN ORGANIZED HEALTH CARE EDUCATION/TRAINING PROGRAM
Payer: COMMERCIAL

## 2022-10-19 ENCOUNTER — APPOINTMENT (OUTPATIENT)
Dept: PHYSICAL THERAPY | Facility: REHABILITATION | Age: 37
End: 2022-10-19
Attending: STUDENT IN AN ORGANIZED HEALTH CARE EDUCATION/TRAINING PROGRAM
Payer: COMMERCIAL

## 2022-10-21 ENCOUNTER — APPOINTMENT (OUTPATIENT)
Dept: PHYSICAL THERAPY | Facility: REHABILITATION | Age: 37
End: 2022-10-21
Attending: STUDENT IN AN ORGANIZED HEALTH CARE EDUCATION/TRAINING PROGRAM
Payer: COMMERCIAL

## 2022-10-24 ENCOUNTER — APPOINTMENT (OUTPATIENT)
Dept: PHYSICAL THERAPY | Facility: REHABILITATION | Age: 37
End: 2022-10-24
Attending: STUDENT IN AN ORGANIZED HEALTH CARE EDUCATION/TRAINING PROGRAM
Payer: COMMERCIAL

## 2022-10-26 ENCOUNTER — APPOINTMENT (OUTPATIENT)
Dept: PHYSICAL THERAPY | Facility: REHABILITATION | Age: 37
End: 2022-10-26
Attending: STUDENT IN AN ORGANIZED HEALTH CARE EDUCATION/TRAINING PROGRAM
Payer: COMMERCIAL

## 2022-10-31 ENCOUNTER — APPOINTMENT (OUTPATIENT)
Dept: PHYSICAL THERAPY | Facility: REHABILITATION | Age: 37
End: 2022-10-31
Attending: STUDENT IN AN ORGANIZED HEALTH CARE EDUCATION/TRAINING PROGRAM
Payer: COMMERCIAL

## 2022-11-02 ENCOUNTER — APPOINTMENT (OUTPATIENT)
Dept: PHYSICAL THERAPY | Facility: REHABILITATION | Age: 37
End: 2022-11-02
Attending: STUDENT IN AN ORGANIZED HEALTH CARE EDUCATION/TRAINING PROGRAM
Payer: COMMERCIAL

## 2023-03-30 ENCOUNTER — HOSPITAL ENCOUNTER (EMERGENCY)
Facility: MEDICAL CENTER | Age: 38
End: 2023-03-30
Attending: EMERGENCY MEDICINE
Payer: COMMERCIAL

## 2023-03-30 VITALS
HEART RATE: 97 BPM | SYSTOLIC BLOOD PRESSURE: 155 MMHG | OXYGEN SATURATION: 98 % | HEIGHT: 67 IN | WEIGHT: 240.74 LBS | RESPIRATION RATE: 18 BRPM | DIASTOLIC BLOOD PRESSURE: 98 MMHG | TEMPERATURE: 98.3 F | BODY MASS INDEX: 37.79 KG/M2

## 2023-03-30 DIAGNOSIS — Z20.2 EXPOSURE TO CHLAMYDIA: ICD-10-CM

## 2023-03-30 DIAGNOSIS — Z20.2 STD EXPOSURE: ICD-10-CM

## 2023-03-30 LAB
HCG UR QL: NEGATIVE
HIV 1+2 AB+HIV1 P24 AG SERPL QL IA: NORMAL
T PALLIDUM AB SER QL IA: NORMAL

## 2023-03-30 PROCEDURE — 81025 URINE PREGNANCY TEST: CPT

## 2023-03-30 PROCEDURE — 87389 HIV-1 AG W/HIV-1&-2 AB AG IA: CPT

## 2023-03-30 PROCEDURE — 87591 N.GONORRHOEAE DNA AMP PROB: CPT | Mod: 91

## 2023-03-30 PROCEDURE — 87480 CANDIDA DNA DIR PROBE: CPT

## 2023-03-30 PROCEDURE — 700102 HCHG RX REV CODE 250 W/ 637 OVERRIDE(OP): Performed by: EMERGENCY MEDICINE

## 2023-03-30 PROCEDURE — 86780 TREPONEMA PALLIDUM: CPT

## 2023-03-30 PROCEDURE — 36415 COLL VENOUS BLD VENIPUNCTURE: CPT

## 2023-03-30 PROCEDURE — 87660 TRICHOMONAS VAGIN DIR PROBE: CPT

## 2023-03-30 PROCEDURE — 87491 CHLMYD TRACH DNA AMP PROBE: CPT | Mod: 91

## 2023-03-30 PROCEDURE — A9270 NON-COVERED ITEM OR SERVICE: HCPCS | Performed by: EMERGENCY MEDICINE

## 2023-03-30 PROCEDURE — 96372 THER/PROPH/DIAG INJ SC/IM: CPT

## 2023-03-30 PROCEDURE — 99284 EMERGENCY DEPT VISIT MOD MDM: CPT

## 2023-03-30 PROCEDURE — 87510 GARDNER VAG DNA DIR PROBE: CPT

## 2023-03-30 PROCEDURE — 700111 HCHG RX REV CODE 636 W/ 250 OVERRIDE (IP): Performed by: EMERGENCY MEDICINE

## 2023-03-30 RX ORDER — METRONIDAZOLE 500 MG/1
500 TABLET ORAL ONCE
Status: COMPLETED | OUTPATIENT
Start: 2023-03-30 | End: 2023-03-30

## 2023-03-30 RX ORDER — DOXYCYCLINE 100 MG/1
100 TABLET ORAL ONCE
Status: COMPLETED | OUTPATIENT
Start: 2023-03-30 | End: 2023-03-30

## 2023-03-30 RX ORDER — DOXYCYCLINE 100 MG/1
100 CAPSULE ORAL 2 TIMES DAILY
Qty: 28 CAPSULE | Refills: 0 | Status: ACTIVE | OUTPATIENT
Start: 2023-03-30 | End: 2023-04-13

## 2023-03-30 RX ORDER — CEFTRIAXONE 500 MG/1
500 INJECTION, POWDER, FOR SOLUTION INTRAMUSCULAR; INTRAVENOUS ONCE
Status: COMPLETED | OUTPATIENT
Start: 2023-03-30 | End: 2023-03-30

## 2023-03-30 RX ORDER — METRONIDAZOLE 500 MG/1
500 TABLET ORAL 2 TIMES DAILY
Qty: 28 TABLET | Refills: 0 | Status: ACTIVE | OUTPATIENT
Start: 2023-03-30 | End: 2023-04-13

## 2023-03-30 RX ADMIN — METRONIDAZOLE 500 MG: 500 TABLET ORAL at 23:01

## 2023-03-30 RX ADMIN — DOXYCYCLINE 100 MG: 100 TABLET, FILM COATED ORAL at 23:01

## 2023-03-30 RX ADMIN — CEFTRIAXONE SODIUM 500 MG: 500 INJECTION, POWDER, FOR SOLUTION INTRAMUSCULAR; INTRAVENOUS at 23:01

## 2023-03-30 ASSESSMENT — FIBROSIS 4 INDEX: FIB4 SCORE: 0.57

## 2023-03-30 NOTE — LETTER
4/2/2023               Jg Thompson  [address]  [facility] [address]        Dear Jg (MR#[id number])    As we have been unable to contact you by phone, this letter is sent in regards to your, recent visit to the [facility] Emergency Department on 3/30/2023. During the visit, tests were performed to assist the physician in your medical diagnosis. A review of your tests requires that we notify you of the following:    Your culture test was POSITIVE for Gonorrhea and Chlamydia, a sexually transmitted infection. This was treated appropriately in the Emergency Department and with the antibiotics prescribed for you (doxycycline) on discharge. It is important that you continue taking your antibiotic until it is finished..   It is advised that you inform your sexual partner(s) within the previous 60 days of the above findings and direct them to the Health Department for testing, and to abstain from sexual intercourse seven days after the completion of antibiotic treatment. Should your symptoms progress, it is important that you follow up with your primary care physician, your local urgent care office, or return to the emergency department for further work up in order to prevent long term health issues.      Additionally, patients who are HIV negative and have been diagnosed with a sexually transmitted infection (STI) in the past 6 months are considered for PrEP.  PrEP stands for Pre-Exposure Prophylaxis. It is a once-daily pill regimen that can help you stay HIV-negative. When taken as prescribed, PrEP has been shown to be safe and highly effective against peter HIV. While PrEP does not protect against other sexually transmitted infections or unwanted pregnancy, it can be paired with condoms and several other prevention strategies for additional protection. We have a clinic here in [facility] that can help you obtain this medication, if interested please contact the number below.       Thank you for your  cooperation in the matter.    Sincerely,  ED Culture Follow-Up Staff  Gagan Webber, VianeyD  [phone]    [facility] Emergency Department  [address] [place] [id number] [phone] (ED Culture Line)

## 2023-03-31 LAB
C TRACH DNA GENITAL QL NAA+PROBE: POSITIVE
C TRACH DNA SPEC QL NAA+PROBE: POSITIVE
CANDIDA DNA VAG QL PROBE+SIG AMP: NEGATIVE
G VAGINALIS DNA VAG QL PROBE+SIG AMP: POSITIVE
N GONORRHOEA DNA GENITAL QL NAA+PROBE: POSITIVE
N GONORRHOEA DNA SPEC QL NAA+PROBE: POSITIVE
SPECIMEN SOURCE: ABNORMAL
SPECIMEN SOURCE: ABNORMAL
T VAGINALIS DNA VAG QL PROBE+SIG AMP: POSITIVE

## 2023-03-31 NOTE — ED TRIAGE NOTES
"Chief Complaint   Patient presents with    Exposure to STD     Patient ambulates to triage c/o possible exposure to STD, currently has foul ordered discharge. Pain and burning with urination, possibly exposed to chlamydia.      BP (!) 164/108   Pulse (!) 103   Temp 36.8 °C (98.2 °F) (Temporal)   Resp 16   Ht 1.702 m (5' 7\")   Wt 109 kg (240 lb 11.9 oz)   SpO2 100%     STD protocol ordered, patient provided with urine specimen collection kit, instructed to notify staff of any new or worsening symptoms. Returned to ed waiting area. Apologized for wait times.   "

## 2023-03-31 NOTE — ED PROVIDER NOTES
ED Provider Note    CHIEF COMPLAINT  Chief Complaint   Patient presents with    Exposure to STD     Patient ambulates to triage c/o possible exposure to STD, currently has foul ordered discharge. Pain and burning with urination, possibly exposed to chlamydia.        EXTERNAL RECORDS REVIEWED  Outpatient Notes      HPI/ROS  LIMITATION TO HISTORY   Select: : None  OUTSIDE HISTORIAN(S):       Jg Thompson is a 38 y.o. female who presents with vaginal discharge for about a month now.  Has some lower pelvic discomfort as well.  She was recently notified by another woman who had chlamydia from a partner that the patient has been with in the past.  She notes that this is her second time getting a sexually transmitted disease.  Last was about a year ago.  No vomiting.  No fevers.  No back pain.  No dysuria or hematuria.  She would like to get tested for STDs given recent information suggestion she was exposed to chlamydia and prolonged discharge over the past month.    PAST MEDICAL HISTORY   has a past medical history of Anxiety, Depression, Herpes, Lumbosacral radiculitis (1/30/2018), Other fatigue (7/30/2018), and Substance abuse ([facility]).    SURGICAL HISTORY   has a past surgical history that includes tonsillectomy.    FAMILY HISTORY  Family History   Problem Relation Age of Onset    Psychiatric Illness Mother     Thyroid Mother     Hypertension Mother     Diabetes Mother     Hypertension Maternal Grandfather        SOCIAL HISTORY  Social History     Tobacco Use    Smoking status: Every Day     Packs/day: 0.50     Types: Cigarettes    Smokeless tobacco: Never    Tobacco comments:     vape   Vaping Use    Vaping Use: Every day    Substances: Nicotine, THC, Flavoring    Devices: Refillable tank   Substance and Sexual Activity    Alcohol use: Yes     Comment: occasional    Drug use: Yes     Types: Methamphetamines     Comment: lasted used 2 yrs ago, IV user. every day.     Sexual activity: Yes     Partners: Male  "      CURRENT MEDICATIONS  Home Medications    **Home medications have not yet been reviewed for this encounter**         ALLERGIES  Allergies   Allergen Reactions    Penicillins        PHYSICAL EXAM  VITAL SIGNS: BP (!) 164/108   Pulse (!) 103   Temp 36.8 °C (98.2 °F) (Temporal)   Resp 16   Ht 1.702 m (5' 7\")   Wt 109 kg (240 lb 11.9 oz)   SpO2 100%   BMI 37.71 kg/m²    Constitutional: Alert in no apparent distress.  HENT: No signs of trauma, Bilateral external ears normal, Nose normal.   Neck: Normal range of motion, Supple, No stridor.   Cardiovascular: Regular rate and rhythm.   Thorax & Lungs: Normal breath sounds, No respiratory distress  Abdomen: Soft, No tenderness, No peritoneal signs, No masses.   : Scant vaginal discharge.  Cervical motion tenderness present, no herpetic lesions  Skin: Warm, Dry, No erythema, No rash.   Back: No bony tenderness, No CVA tenderness.   Musculoskeletal: Good range of motion in all major joints. No major deformities noted.   Neurologic: Alert      DIAGNOSTIC STUDIES / PROCEDURES  EKG    LABS  Labs Reviewed   VAGINAL PATHOGENS DNA PANEL - Abnormal; Notable for the following components:       Result Value    Trichamonas vaginalis DNA Probe POSITIVE (*)     Gardnerella vaginalis DNA Probe POSITIVE (*)     All other components within normal limits   CHLAMYDIA/GC, PCR (URINE)   T.PALLIDUM AB HOANG (SCREENING)   HIV AG/AB COMBO ASSAY SCREENING   HCG QUALITATIVE UR   CHLAMYDIA/GC, PCR (GENITAL/ANAL SWAB)     RADIOLOGY    COURSE & MEDICAL DECISION MAKING    ED Observation Status? No; Patient does not meet criteria for ED Observation.     INITIAL ASSESSMENT, COURSE AND PLAN  Care Narrative: 38 y.o. female presenting with vaginal discharge for about a month and recent information regarding a prior partner who had chlamydia.  She is concerned that she also has chlamydia.  No vomiting or fevers.  No back pain.  No sniffing abdominal pain.  Occasionally has left lower pelvic pain.  " No dysuria or hematuria.    Pelvic exam was performed.  She has scant discharge and cervical motion tenderness.  Swabs were sent and the patient was treated empirically for gonorrhea, chlamydia, trichomoniasis, bacterial vaginosis with ceftriaxone, doxycycline, Flagy.    I recommended that she follow-up with the test results to see what potential infections may be positive.  After discharge, she was found to be positive for trichomoniasis and bacterial vaginosis.  Negative for HIV and syphilis.  Other STD testing is pending.  She was treated with Flagyl which should cover the bacterial vaginosis and trichomoniasis diagnoses.    HTN/IDDM FOLLOW UP:  The patient is referred to a primary physician for blood pressure management, diabetic screening, and for all other preventive health concerns      ADDITIONAL PROBLEM LIST    DISPOSITION AND DISCUSSIONS  I have discussed management of the patient with the following physicians and ANA's:      Discussion of management with other QHP or appropriate source(s): None     Escalation of care considered, and ultimately not performed:    Barriers to care at this time, including but not limited to:    .     Decision tools and prescription drugs considered including, but not limited to: Antibiotics   .    FINAL DIAGNOSIS  1. STD exposure    2. Exposure to chlamydia          Electronically signed by: Nimesh Viramontes M.D., 3/30/2023 10:21 PM

## 2023-04-03 NOTE — ED NOTES
"ED Positive Culture Follow-up/Notification Note:    Date: 4/2/2023     Patient seen in the ED on 3/30/2023 for exposure to STI, odorous discharge, dysuria. Patient was notified by another woman about a mutual partner who tested positive for chlamydia. No fever, no vomiting, no CVA tenderness. Scant vaginal discharge and cervical motion tenderness are present. Patient was given a dose of ceftriaxone in the ED and discharged on 7 days each of doxycycline and metronidazole.  1. STD exposure    2. Exposure to chlamydia       Discharge Medication List as of 3/30/2023 11:33 PM        START taking these medications    Details   doxycycline (MONODOX) 100 MG capsule Take 1 Capsule by mouth 2 times a day for 14 days., Disp-28 Capsule, R-0, Normal      metroNIDAZOLE (FLAGYL) 500 MG Tab Take 1 Tablet by mouth 2 times a day for 14 days., Disp-28 Tablet, R-0, Normal             Allergies: Penicillins     Vitals:    03/30/23 2110 03/30/23 2120 03/30/23 2338   BP: (!) 164/108  (!) 155/98   Pulse: (!) 103  97   Resp: 16  18   Temp: 36.8 °C (98.2 °F)  36.8 °C (98.3 °F)   TempSrc: Temporal  Temporal   SpO2: 100%  98%   Weight:  109 kg (240 lb 11.9 oz)    Height:  1.702 m (5' 7\")        Final cultures:   Results       Procedure Component Value Units Date/Time    Chlamydia/GC, PCR (Urine) [[id number]]  (Abnormal) Collected: 03/30/23 2204    Order Status: Completed Specimen: Urine Updated: 03/31/23 1857     C. trachomatis by PCR POSITIVE     Gc By Dna Probe POSITIVE     Source Urine    Chlamydia/GC, PCR (Genital/Anal swab) [[id number]]  (Abnormal) Collected: 03/30/23 2233    Order Status: Completed Updated: 03/31/23 1856     C. trachomatis by PCR POSITIVE     N. gonorrhoeae by PCR POSITIVE     Source Cervical    Chlamydia/GC, PCR (Urine) [[id number]] Collected: 03/30/23 0000    Order Status: Canceled Specimen: Genital             Plan:   Patient had Trichomonas and Gardnerella on wet prep, consistent with her discharge and burning. She is " also positive for chlamydia and gonorrhea. Negative for syphilis and HIV. Patient was treated appropriately for gonorrhea in the ED with ceftriaxone, chlamydia is covered by doxycycline, and Flagyl will cover her wet prep results. I called the patient to discuss results and provide education. Left a voice mail. Will send a letter to the patient's MyChart.    Gagan Webber, VianeyD

## 2023-07-11 ENCOUNTER — APPOINTMENT (OUTPATIENT)
Dept: URGENT CARE | Facility: CLINIC | Age: 38
End: 2023-07-11
Payer: COMMERCIAL

## 2023-07-11 ENCOUNTER — HOSPITAL ENCOUNTER (OUTPATIENT)
Facility: MEDICAL CENTER | Age: 38
End: 2023-07-11
Attending: NURSE PRACTITIONER
Payer: COMMERCIAL

## 2023-07-11 ENCOUNTER — OFFICE VISIT (OUTPATIENT)
Dept: URGENT CARE | Facility: CLINIC | Age: 38
End: 2023-07-11
Payer: COMMERCIAL

## 2023-07-11 VITALS
WEIGHT: 220 LBS | RESPIRATION RATE: 16 BRPM | OXYGEN SATURATION: 98 % | TEMPERATURE: 98.2 F | HEART RATE: 113 BPM | HEIGHT: 68 IN | BODY MASS INDEX: 33.34 KG/M2 | SYSTOLIC BLOOD PRESSURE: 144 MMHG | DIASTOLIC BLOOD PRESSURE: 86 MMHG

## 2023-07-11 DIAGNOSIS — R30.0 DYSURIA: ICD-10-CM

## 2023-07-11 DIAGNOSIS — Z20.2 EXPOSURE TO CHLAMYDIA: ICD-10-CM

## 2023-07-11 DIAGNOSIS — Z20.2 EXPOSURE TO GONORRHEA: ICD-10-CM

## 2023-07-11 LAB
APPEARANCE UR: NORMAL
BILIRUB UR STRIP-MCNC: NEGATIVE MG/DL
COLOR UR AUTO: NORMAL
GLUCOSE UR STRIP.AUTO-MCNC: NEGATIVE MG/DL
KETONES UR STRIP.AUTO-MCNC: NEGATIVE MG/DL
LEUKOCYTE ESTERASE UR QL STRIP.AUTO: NORMAL
NITRITE UR QL STRIP.AUTO: POSITIVE
PH UR STRIP.AUTO: 5.5 [PH] (ref 5–8)
POCT INT CON NEG: NEGATIVE
POCT INT CON POS: POSITIVE
POCT URINE PREGNANCY TEST: NEGATIVE
PROT UR QL STRIP: NORMAL MG/DL
RBC UR QL AUTO: NEGATIVE
SP GR UR STRIP.AUTO: 1.03
UROBILINOGEN UR STRIP-MCNC: 0.2 MG/DL

## 2023-07-11 PROCEDURE — 87077 CULTURE AEROBIC IDENTIFY: CPT

## 2023-07-11 PROCEDURE — 3079F DIAST BP 80-89 MM HG: CPT | Performed by: NURSE PRACTITIONER

## 2023-07-11 PROCEDURE — 99213 OFFICE O/P EST LOW 20 MIN: CPT | Performed by: NURSE PRACTITIONER

## 2023-07-11 PROCEDURE — 87591 N.GONORRHOEAE DNA AMP PROB: CPT

## 2023-07-11 PROCEDURE — 81002 URINALYSIS NONAUTO W/O SCOPE: CPT | Performed by: NURSE PRACTITIONER

## 2023-07-11 PROCEDURE — 81025 URINE PREGNANCY TEST: CPT | Performed by: NURSE PRACTITIONER

## 2023-07-11 PROCEDURE — 87480 CANDIDA DNA DIR PROBE: CPT

## 2023-07-11 PROCEDURE — 3077F SYST BP >= 140 MM HG: CPT | Performed by: NURSE PRACTITIONER

## 2023-07-11 PROCEDURE — 87086 URINE CULTURE/COLONY COUNT: CPT

## 2023-07-11 PROCEDURE — 87491 CHLMYD TRACH DNA AMP PROBE: CPT

## 2023-07-11 PROCEDURE — 87510 GARDNER VAG DNA DIR PROBE: CPT

## 2023-07-11 PROCEDURE — 87186 SC STD MICRODIL/AGAR DIL: CPT

## 2023-07-11 PROCEDURE — 87660 TRICHOMONAS VAGIN DIR PROBE: CPT

## 2023-07-11 RX ORDER — DOXYCYCLINE HYCLATE 100 MG
100 TABLET ORAL 2 TIMES DAILY
Qty: 14 TABLET | Refills: 0 | Status: SHIPPED | OUTPATIENT
Start: 2023-07-11 | End: 2023-07-18

## 2023-07-11 ASSESSMENT — ENCOUNTER SYMPTOMS
FEVER: 0
FATIGUE: 0
FLANK PAIN: 0

## 2023-07-11 ASSESSMENT — FIBROSIS 4 INDEX: FIB4 SCORE: 0.57

## 2023-07-12 DIAGNOSIS — N76.0 BV (BACTERIAL VAGINOSIS): ICD-10-CM

## 2023-07-12 DIAGNOSIS — B96.89 BV (BACTERIAL VAGINOSIS): ICD-10-CM

## 2023-07-12 RX ORDER — METRONIDAZOLE 500 MG/1
500 TABLET ORAL 2 TIMES DAILY
Qty: 14 TABLET | Refills: 0 | Status: SHIPPED | OUTPATIENT
Start: 2023-07-12 | End: 2023-07-19

## 2023-07-12 NOTE — PROGRESS NOTES
"Subjective:   Jg Thompson is a 38 y.o. female who presents for Exposure to STD and Vaginal Discharge      Exposure to STD  This is a new problem. Episode onset: Exposure to chlamydia and gonorrhea. The problem occurs constantly. The problem has been unchanged. Associated symptoms include urinary symptoms. Pertinent negatives include no fatigue or fever. She has tried nothing for the symptoms.       Review of Systems   Constitutional:  Negative for fatigue and fever.   Genitourinary:  Positive for dysuria. Negative for flank pain, frequency, hematuria and urgency.        Vaginal discharge       Medications:    doxycycline Tabs    Allergies: Penicillins    Problem List: Jg Thompson does not have any pertinent problems on file.    Surgical History:  Past Surgical History:   Procedure Laterality Date    TONSILLECTOMY         Past Social Hx: Jg Thompson  reports that she has been smoking cigarettes. She has been smoking an average of .5 packs per day. She has never used smokeless tobacco. She reports current alcohol use. She reports current drug use. Drug: Methamphetamines.     Past Family Hx:  Jg Thompson family history includes Diabetes in her mother; Hypertension in her maternal grandfather and mother; Psychiatric Illness in her mother; Thyroid in her mother.     Problem list, medications, and allergies reviewed by myself today in Epic.     Objective:     BP (!) 144/86 (BP Location: Left arm, Patient Position: Sitting, BP Cuff Size: Adult)   Pulse (!) 113   Temp 36.8 °C (98.2 °F) (Temporal)   Resp 16   Ht 1.727 m (5' 8\")   Wt 99.8 kg (220 lb)   SpO2 98%   BMI 33.45 kg/m²     Physical Exam  Constitutional:       Appearance: Normal appearance. She is not ill-appearing or toxic-appearing.   HENT:      Head: Normocephalic.      Right Ear: External ear normal.      Left Ear: External ear normal.      Nose: Nose normal.      Mouth/Throat:      Lips: Pink.   Eyes:      General: Lids " are normal.   Pulmonary:      Effort: Pulmonary effort is normal. No accessory muscle usage.   Genitourinary:     Comments: Deferred  Musculoskeletal:      Cervical back: Full passive range of motion without pain.   Neurological:      Mental Status: She is alert and oriented to person, place, and time.   Psychiatric:         Mood and Affect: Mood normal.         Thought Content: Thought content normal.         Assessment/Plan:     Diagnosis and associated orders:     1. Exposure to chlamydia  POCT Urinalysis    POCT Pregnancy    doxycycline (VIBRAMYCIN) 100 MG Tab    VAGINAL PATHOGENS DNA PANEL    Chlamydia/GC, PCR (Genital/Anal swab)    cefTRIAXone (Rocephin) 500 mg, lidocaine (Xylocaine) 1 % 2 mL for IM use      2. Exposure to gonorrhea  POCT Urinalysis    POCT Pregnancy    doxycycline (VIBRAMYCIN) 100 MG Tab    VAGINAL PATHOGENS DNA PANEL    Chlamydia/GC, PCR (Genital/Anal swab)    cefTRIAXone (Rocephin) 500 mg, lidocaine (Xylocaine) 1 % 2 mL for IM use      3. Dysuria  URINE CULTURE(NEW)         Comments/MDM:     I personally reviewed prior external notes and prior test results pertinent to today's visit.   Discussed management options, risks and benefits, and alternatives to treatment plan agreed upon.   Red flags discussed and indications to immediately call 911 or present to the Emergency Department.   Supportive care, differential diagnoses, and indications for immediate follow-up discussed with patient.    Patient expresses understanding and agrees to plan. Patient denies any other questions or concerns.                Please note that this dictation was created using voice recognition software. I have made a reasonable attempt to correct obvious errors, but I expect that there are errors of grammar and possibly content that I did not discover before finalizing the note.    This note was electronically signed by Mohinder PURDY.

## 2023-08-01 LAB — SPECIMEN SOURCE: NORMAL

## 2023-10-08 ENCOUNTER — APPOINTMENT (OUTPATIENT)
Dept: URGENT CARE | Facility: CLINIC | Age: 38
End: 2023-10-08

## 2023-11-13 ENCOUNTER — APPOINTMENT (OUTPATIENT)
Dept: URGENT CARE | Facility: CLINIC | Age: 38
End: 2023-11-13

## 2024-04-13 ENCOUNTER — APPOINTMENT (OUTPATIENT)
Dept: URGENT CARE | Facility: PHYSICIAN GROUP | Age: 39
End: 2024-04-13

## 2024-08-17 ENCOUNTER — HOSPITAL ENCOUNTER (OUTPATIENT)
Dept: LAB | Facility: MEDICAL CENTER | Age: 39
End: 2024-08-17
Attending: NURSE PRACTITIONER
Payer: COMMERCIAL

## 2024-08-17 LAB
25(OH)D3 SERPL-MCNC: 24 NG/ML (ref 30–100)
ALBUMIN SERPL BCP-MCNC: 4 G/DL (ref 3.2–4.9)
ALBUMIN/GLOB SERPL: 1.4 G/DL
ALP SERPL-CCNC: 59 U/L (ref 30–99)
ALT SERPL-CCNC: 18 U/L (ref 2–50)
ANION GAP SERPL CALC-SCNC: 10 MMOL/L (ref 7–16)
AST SERPL-CCNC: 14 U/L (ref 12–45)
BASOPHILS # BLD AUTO: 1.2 % (ref 0–1.8)
BASOPHILS # BLD: 0.09 K/UL (ref 0–0.12)
BILIRUB SERPL-MCNC: 0.5 MG/DL (ref 0.1–1.5)
BUN SERPL-MCNC: 11 MG/DL (ref 8–22)
CALCIUM ALBUM COR SERPL-MCNC: 8.6 MG/DL (ref 8.5–10.5)
CALCIUM SERPL-MCNC: 8.6 MG/DL (ref 8.5–10.5)
CHLORIDE SERPL-SCNC: 107 MMOL/L (ref 96–112)
CHOLEST SERPL-MCNC: 156 MG/DL (ref 100–199)
CO2 SERPL-SCNC: 22 MMOL/L (ref 20–33)
CREAT SERPL-MCNC: 0.64 MG/DL (ref 0.5–1.4)
EOSINOPHIL # BLD AUTO: 0.84 K/UL (ref 0–0.51)
EOSINOPHIL NFR BLD: 10.7 % (ref 0–6.9)
ERYTHROCYTE [DISTWIDTH] IN BLOOD BY AUTOMATED COUNT: 42.3 FL (ref 35.9–50)
FASTING STATUS PATIENT QL REPORTED: NORMAL
FOLATE SERPL-MCNC: 10.6 NG/ML
GFR SERPLBLD CREATININE-BSD FMLA CKD-EPI: 115 ML/MIN/1.73 M 2
GLOBULIN SER CALC-MCNC: 2.8 G/DL (ref 1.9–3.5)
GLUCOSE SERPL-MCNC: 92 MG/DL (ref 65–99)
HAV IGM SERPL QL IA: NORMAL
HBV CORE IGM SER QL: NORMAL
HBV SURFACE AG SER QL: NORMAL
HCT VFR BLD AUTO: 48.9 % (ref 37–47)
HCV AB SER QL: NORMAL
HDLC SERPL-MCNC: 50 MG/DL
HGB BLD-MCNC: 16.5 G/DL (ref 12–16)
HIV 1+2 AB+HIV1 P24 AG SERPL QL IA: NORMAL
IMM GRANULOCYTES # BLD AUTO: 0.01 K/UL (ref 0–0.11)
IMM GRANULOCYTES NFR BLD AUTO: 0.1 % (ref 0–0.9)
LDLC SERPL CALC-MCNC: 81 MG/DL
LYMPHOCYTES # BLD AUTO: 1.95 K/UL (ref 1–4.8)
LYMPHOCYTES NFR BLD: 24.9 % (ref 22–41)
MCH RBC QN AUTO: 29.5 PG (ref 27–33)
MCHC RBC AUTO-ENTMCNC: 33.7 G/DL (ref 32.2–35.5)
MCV RBC AUTO: 87.3 FL (ref 81.4–97.8)
MONOCYTES # BLD AUTO: 0.49 K/UL (ref 0–0.85)
MONOCYTES NFR BLD AUTO: 6.3 % (ref 0–13.4)
NEUTROPHILS # BLD AUTO: 4.44 K/UL (ref 1.82–7.42)
NEUTROPHILS NFR BLD: 56.8 % (ref 44–72)
NRBC # BLD AUTO: 0 K/UL
NRBC BLD-RTO: 0 /100 WBC (ref 0–0.2)
PLATELET # BLD AUTO: 347 K/UL (ref 164–446)
PMV BLD AUTO: 9.3 FL (ref 9–12.9)
POTASSIUM SERPL-SCNC: 4.2 MMOL/L (ref 3.6–5.5)
PROT SERPL-MCNC: 6.8 G/DL (ref 6–8.2)
RBC # BLD AUTO: 5.6 M/UL (ref 4.2–5.4)
SODIUM SERPL-SCNC: 139 MMOL/L (ref 135–145)
T PALLIDUM AB SER QL IA: NORMAL
T3FREE SERPL-MCNC: 3.03 PG/ML (ref 2–4.4)
T4 FREE SERPL-MCNC: 1.23 NG/DL (ref 0.93–1.7)
THYROPEROXIDASE AB SERPL-ACNC: 210 IU/ML (ref 0–9)
TRIGL SERPL-MCNC: 123 MG/DL (ref 0–149)
TSH SERPL-ACNC: 0.55 UIU/ML (ref 0.35–5.5)
VIT B12 SERPL-MCNC: 735 PG/ML (ref 211–911)
WBC # BLD AUTO: 7.8 K/UL (ref 4.8–10.8)

## 2024-08-17 PROCEDURE — 86800 THYROGLOBULIN ANTIBODY: CPT

## 2024-08-17 PROCEDURE — 84439 ASSAY OF FREE THYROXINE: CPT

## 2024-08-17 PROCEDURE — 82607 VITAMIN B-12: CPT

## 2024-08-17 PROCEDURE — 82746 ASSAY OF FOLIC ACID SERUM: CPT

## 2024-08-17 PROCEDURE — 84481 FREE ASSAY (FT-3): CPT

## 2024-08-17 PROCEDURE — 80061 LIPID PANEL: CPT

## 2024-08-17 PROCEDURE — 85025 COMPLETE CBC W/AUTO DIFF WBC: CPT

## 2024-08-17 PROCEDURE — 82306 VITAMIN D 25 HYDROXY: CPT

## 2024-08-17 PROCEDURE — 87591 N.GONORRHOEAE DNA AMP PROB: CPT

## 2024-08-17 PROCEDURE — 84443 ASSAY THYROID STIM HORMONE: CPT

## 2024-08-17 PROCEDURE — 87491 CHLMYD TRACH DNA AMP PROBE: CPT

## 2024-08-17 PROCEDURE — 36415 COLL VENOUS BLD VENIPUNCTURE: CPT

## 2024-08-17 PROCEDURE — 86376 MICROSOMAL ANTIBODY EACH: CPT

## 2024-08-17 PROCEDURE — 80074 ACUTE HEPATITIS PANEL: CPT

## 2024-08-17 PROCEDURE — 87389 HIV-1 AG W/HIV-1&-2 AB AG IA: CPT

## 2024-08-17 PROCEDURE — 86780 TREPONEMA PALLIDUM: CPT

## 2024-08-17 PROCEDURE — 80053 COMPREHEN METABOLIC PANEL: CPT

## 2024-08-19 LAB — THYROGLOB AB SERPL-ACNC: 0.9 IU/ML (ref 0–4)
